# Patient Record
Sex: MALE | Race: WHITE | HISPANIC OR LATINO
[De-identification: names, ages, dates, MRNs, and addresses within clinical notes are randomized per-mention and may not be internally consistent; named-entity substitution may affect disease eponyms.]

---

## 2017-01-03 ENCOUNTER — APPOINTMENT (OUTPATIENT)
Dept: CT IMAGING | Facility: HOSPITAL | Age: 67
End: 2017-01-03

## 2017-01-08 ENCOUNTER — FORM ENCOUNTER (OUTPATIENT)
Age: 67
End: 2017-01-08

## 2017-01-09 ENCOUNTER — APPOINTMENT (OUTPATIENT)
Dept: CT IMAGING | Facility: HOSPITAL | Age: 67
End: 2017-01-09

## 2017-01-09 ENCOUNTER — APPOINTMENT (OUTPATIENT)
Dept: HEMATOLOGY ONCOLOGY | Facility: CLINIC | Age: 67
End: 2017-01-09

## 2017-01-09 ENCOUNTER — OUTPATIENT (OUTPATIENT)
Dept: OUTPATIENT SERVICES | Facility: HOSPITAL | Age: 67
LOS: 1 days | End: 2017-01-09
Payer: COMMERCIAL

## 2017-01-09 PROCEDURE — 71260 CT THORAX DX C+: CPT

## 2017-01-09 PROCEDURE — 74177 CT ABD & PELVIS W/CONTRAST: CPT

## 2017-01-23 ENCOUNTER — APPOINTMENT (OUTPATIENT)
Dept: HEMATOLOGY ONCOLOGY | Facility: CLINIC | Age: 67
End: 2017-01-23

## 2017-01-23 VITALS
SYSTOLIC BLOOD PRESSURE: 132 MMHG | DIASTOLIC BLOOD PRESSURE: 80 MMHG | HEART RATE: 76 BPM | TEMPERATURE: 97.9 F | WEIGHT: 161 LBS | BODY MASS INDEX: 27.64 KG/M2

## 2017-01-23 DIAGNOSIS — N40.0 BENIGN PROSTATIC HYPERPLASIA WITHOUT LOWER URINARY TRACT SYMPMS: ICD-10-CM

## 2017-05-08 ENCOUNTER — APPOINTMENT (OUTPATIENT)
Dept: HEMATOLOGY ONCOLOGY | Facility: CLINIC | Age: 67
End: 2017-05-08

## 2017-05-08 VITALS
WEIGHT: 163 LBS | SYSTOLIC BLOOD PRESSURE: 122 MMHG | BODY MASS INDEX: 27.98 KG/M2 | HEART RATE: 68 BPM | DIASTOLIC BLOOD PRESSURE: 60 MMHG | TEMPERATURE: 98.7 F

## 2018-03-06 ENCOUNTER — APPOINTMENT (OUTPATIENT)
Dept: HEMATOLOGY ONCOLOGY | Facility: CLINIC | Age: 68
End: 2018-03-06
Payer: COMMERCIAL

## 2018-03-06 VITALS
SYSTOLIC BLOOD PRESSURE: 120 MMHG | HEIGHT: 64 IN | BODY MASS INDEX: 26.98 KG/M2 | HEART RATE: 76 BPM | RESPIRATION RATE: 14 BRPM | WEIGHT: 158 LBS | DIASTOLIC BLOOD PRESSURE: 60 MMHG | TEMPERATURE: 98.7 F

## 2018-03-06 PROCEDURE — 99214 OFFICE O/P EST MOD 30 MIN: CPT

## 2018-07-09 ENCOUNTER — APPOINTMENT (OUTPATIENT)
Dept: HEMATOLOGY ONCOLOGY | Facility: CLINIC | Age: 68
End: 2018-07-09

## 2018-08-06 ENCOUNTER — APPOINTMENT (OUTPATIENT)
Dept: HEMATOLOGY ONCOLOGY | Facility: CLINIC | Age: 68
End: 2018-08-06
Payer: COMMERCIAL

## 2018-08-06 VITALS
HEART RATE: 72 BPM | TEMPERATURE: 98.5 F | DIASTOLIC BLOOD PRESSURE: 60 MMHG | WEIGHT: 160 LBS | BODY MASS INDEX: 27.46 KG/M2 | SYSTOLIC BLOOD PRESSURE: 128 MMHG

## 2018-08-06 PROCEDURE — 99213 OFFICE O/P EST LOW 20 MIN: CPT

## 2018-08-10 ENCOUNTER — LABORATORY RESULT (OUTPATIENT)
Age: 68
End: 2018-08-10

## 2018-08-10 LAB
BASOPHILS # BLD AUTO: 0.02 K/UL
BASOPHILS NFR BLD AUTO: 0.2 %
EOSINOPHIL # BLD AUTO: 0.14 K/UL
EOSINOPHIL NFR BLD AUTO: 1.3 %
HCT VFR BLD CALC: 43.4 %
HGB BLD-MCNC: 14.6 G/DL
IMM GRANULOCYTES NFR BLD AUTO: 0.2 %
LYMPHOCYTES # BLD AUTO: 7.44 K/UL
LYMPHOCYTES NFR BLD AUTO: 67.8 %
MAN DIFF?: NORMAL
MCHC RBC-ENTMCNC: 29.5 PG
MCHC RBC-ENTMCNC: 33.6 GM/DL
MCV RBC AUTO: 87.7 FL
MONOCYTES # BLD AUTO: 0.38 K/UL
MONOCYTES NFR BLD AUTO: 3.5 %
NEUTROPHILS # BLD AUTO: 2.97 K/UL
NEUTROPHILS NFR BLD AUTO: 27 %
PLATELET # BLD AUTO: 153 K/UL
RBC # BLD: 4.95 M/UL
RBC # FLD: 13.8 %
WBC # FLD AUTO: 10.97 K/UL

## 2018-08-11 LAB — LDH SERPL-CCNC: 170 U/L

## 2018-11-19 ENCOUNTER — APPOINTMENT (OUTPATIENT)
Dept: HEMATOLOGY ONCOLOGY | Facility: CLINIC | Age: 68
End: 2018-11-19
Payer: COMMERCIAL

## 2018-11-19 VITALS
DIASTOLIC BLOOD PRESSURE: 70 MMHG | WEIGHT: 158 LBS | BODY MASS INDEX: 27.12 KG/M2 | SYSTOLIC BLOOD PRESSURE: 126 MMHG | HEART RATE: 72 BPM

## 2018-11-19 DIAGNOSIS — C91.90 LYMPHOID LEUKEMIA, UNSPECIFIED NOT HAVING ACHIEVED REMISSION: ICD-10-CM

## 2018-11-19 PROCEDURE — 99214 OFFICE O/P EST MOD 30 MIN: CPT

## 2018-11-19 NOTE — ASSESSMENT
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 In light of our office relocation, patient stated he does not plan to continue followup in our office. He stated he will followup with his primary care physician, Dr. Palma and discuss the situation with her. He stated he would obtain a referral for a new hematologist/oncologist from Dr. Palma.\par Patient's questions have been answered to his satisfaction at this time. He was appreciative of care received.

## 2018-11-19 NOTE — CONSULT LETTER
[Dear  ___] : Dear  [unfilled], [Courtesy Letter:] : I had the pleasure of seeing your patient, [unfilled], in my office today. [Please see my note below.] : Please see my note below. [Consult Closing:] : Thank you very much for allowing me to participate in the care of this patient.  If you have any questions, please do not hesitate to contact me. [Sincerely,] : Sincerely, [FreeTextEntry3] : Beronica Perez M.D.

## 2018-11-19 NOTE — HISTORY OF PRESENT ILLNESS
[de-identified] : 11/2016-Patient presented with leukocytosis (increased lymphocytes). Peripheral blood flow cytometry showed a CD5 positive B-cell monotypic lymphocytosis (also positive with CD23 and FMC-7, negative CD 38) with absolute monotypic B cells approximately 5000/uL (the cutoff distinguishing CLL and monotypic B-cell lymphocytosis of undetermined significance). CT chest/abdomen/pelvis showed no lymphadenopathy or hepatosplenomegaly.\par 8/2018-Peripheral blood FISH analysis was positive in the CLL panel for hemizygous deletion of [id number] in 35% of cells. [de-identified] : Feeling well. No fevers, recent infections, lymph node complaints.\par Princess MENDOZA) present for discussion.\par

## 2020-04-06 ENCOUNTER — INPATIENT (INPATIENT)
Facility: HOSPITAL | Age: 70
LOS: 12 days | DRG: 208 | End: 2020-04-19
Attending: INTERNAL MEDICINE | Admitting: INTERNAL MEDICINE
Payer: COMMERCIAL

## 2020-04-06 VITALS
DIASTOLIC BLOOD PRESSURE: 80 MMHG | RESPIRATION RATE: 26 BRPM | OXYGEN SATURATION: 83 % | SYSTOLIC BLOOD PRESSURE: 152 MMHG | HEART RATE: 118 BPM | TEMPERATURE: 99 F | HEIGHT: 66 IN | WEIGHT: 160.06 LBS

## 2020-04-06 DIAGNOSIS — J96.01 ACUTE RESPIRATORY FAILURE WITH HYPOXIA: ICD-10-CM

## 2020-04-06 LAB
ALBUMIN SERPL ELPH-MCNC: 3.1 G/DL — LOW (ref 3.3–5)
ALP SERPL-CCNC: 76 U/L — SIGNIFICANT CHANGE UP (ref 40–120)
ALT FLD-CCNC: 44 U/L — SIGNIFICANT CHANGE UP (ref 10–45)
ANION GAP SERPL CALC-SCNC: 14 MMOL/L — SIGNIFICANT CHANGE UP (ref 5–17)
AST SERPL-CCNC: 65 U/L — HIGH (ref 10–40)
BASOPHILS # BLD AUTO: 0.04 K/UL — SIGNIFICANT CHANGE UP (ref 0–0.2)
BASOPHILS NFR BLD AUTO: 0.3 % — SIGNIFICANT CHANGE UP (ref 0–2)
BILIRUB SERPL-MCNC: 0.6 MG/DL — SIGNIFICANT CHANGE UP (ref 0.2–1.2)
BUN SERPL-MCNC: 14 MG/DL — SIGNIFICANT CHANGE UP (ref 7–23)
CALCIUM SERPL-MCNC: 8.7 MG/DL — SIGNIFICANT CHANGE UP (ref 8.4–10.5)
CHLORIDE SERPL-SCNC: 97 MMOL/L — SIGNIFICANT CHANGE UP (ref 96–108)
CO2 SERPL-SCNC: 23 MMOL/L — SIGNIFICANT CHANGE UP (ref 22–31)
CREAT SERPL-MCNC: 0.71 MG/DL — SIGNIFICANT CHANGE UP (ref 0.5–1.3)
CRP SERPL-MCNC: 19.86 MG/DL — HIGH (ref 0–0.4)
D DIMER BLD IA.RAPID-MCNC: 349 NG/ML DDU — HIGH
EOSINOPHIL # BLD AUTO: 0 K/UL — SIGNIFICANT CHANGE UP (ref 0–0.5)
EOSINOPHIL NFR BLD AUTO: 0 % — SIGNIFICANT CHANGE UP (ref 0–6)
FERRITIN SERPL-MCNC: 962 NG/ML — HIGH (ref 30–400)
GLUCOSE BLDC GLUCOMTR-MCNC: 203 MG/DL — HIGH (ref 70–99)
GLUCOSE BLDC GLUCOMTR-MCNC: 214 MG/DL — HIGH (ref 70–99)
GLUCOSE BLDC GLUCOMTR-MCNC: 249 MG/DL — HIGH (ref 70–99)
GLUCOSE BLDC GLUCOMTR-MCNC: 305 MG/DL — HIGH (ref 70–99)
GLUCOSE SERPL-MCNC: 228 MG/DL — HIGH (ref 70–99)
HCT VFR BLD CALC: 42.6 % — SIGNIFICANT CHANGE UP (ref 39–50)
HGB BLD-MCNC: 14.1 G/DL — SIGNIFICANT CHANGE UP (ref 13–17)
IMM GRANULOCYTES NFR BLD AUTO: 0.8 % — SIGNIFICANT CHANGE UP (ref 0–1.5)
LACTATE SERPL-SCNC: 1.8 MMOL/L — SIGNIFICANT CHANGE UP (ref 0.7–2)
LDH SERPL L TO P-CCNC: 671 U/L — HIGH (ref 50–242)
LYMPHOCYTES # BLD AUTO: 52.9 % — HIGH (ref 13–44)
LYMPHOCYTES # BLD AUTO: 7.98 K/UL — HIGH (ref 1–3.3)
MCHC RBC-ENTMCNC: 29.3 PG — SIGNIFICANT CHANGE UP (ref 27–34)
MCHC RBC-ENTMCNC: 33.1 GM/DL — SIGNIFICANT CHANGE UP (ref 32–36)
MCV RBC AUTO: 88.4 FL — SIGNIFICANT CHANGE UP (ref 80–100)
MONOCYTES # BLD AUTO: 0.38 K/UL — SIGNIFICANT CHANGE UP (ref 0–0.9)
MONOCYTES NFR BLD AUTO: 2.5 % — SIGNIFICANT CHANGE UP (ref 2–14)
NEUTROPHILS # BLD AUTO: 6.56 K/UL — SIGNIFICANT CHANGE UP (ref 1.8–7.4)
NEUTROPHILS NFR BLD AUTO: 43.5 % — SIGNIFICANT CHANGE UP (ref 43–77)
NRBC # BLD: 0 /100 WBCS — SIGNIFICANT CHANGE UP (ref 0–0)
PLATELET # BLD AUTO: 190 K/UL — SIGNIFICANT CHANGE UP (ref 150–400)
POTASSIUM SERPL-MCNC: 4.7 MMOL/L — SIGNIFICANT CHANGE UP (ref 3.5–5.3)
POTASSIUM SERPL-SCNC: 4.7 MMOL/L — SIGNIFICANT CHANGE UP (ref 3.5–5.3)
PROCALCITONIN SERPL-MCNC: 0.25 NG/ML — HIGH
PROT SERPL-MCNC: 7.5 G/DL — SIGNIFICANT CHANGE UP (ref 6–8.3)
RBC # BLD: 4.82 M/UL — SIGNIFICANT CHANGE UP (ref 4.2–5.8)
RBC # FLD: 13.5 % — SIGNIFICANT CHANGE UP (ref 10.3–14.5)
SODIUM SERPL-SCNC: 134 MMOL/L — LOW (ref 135–145)
WBC # BLD: 15.08 K/UL — HIGH (ref 3.8–10.5)
WBC # FLD AUTO: 15.08 K/UL — HIGH (ref 3.8–10.5)

## 2020-04-06 PROCEDURE — 99291 CRITICAL CARE FIRST HOUR: CPT

## 2020-04-06 PROCEDURE — 71045 X-RAY EXAM CHEST 1 VIEW: CPT | Mod: 26

## 2020-04-06 PROCEDURE — 99223 1ST HOSP IP/OBS HIGH 75: CPT

## 2020-04-06 PROCEDURE — 93010 ELECTROCARDIOGRAM REPORT: CPT

## 2020-04-06 RX ORDER — SITAGLIPTIN 50 MG/1
1 TABLET, FILM COATED ORAL
Qty: 0 | Refills: 0 | DISCHARGE

## 2020-04-06 RX ORDER — LISINOPRIL 2.5 MG/1
1 TABLET ORAL
Qty: 0 | Refills: 0 | DISCHARGE

## 2020-04-06 RX ORDER — ALBUTEROL 90 UG/1
2 AEROSOL, METERED ORAL EVERY 4 HOURS
Refills: 0 | Status: DISCONTINUED | OUTPATIENT
Start: 2020-04-06 | End: 2020-04-19

## 2020-04-06 RX ORDER — DEXTROSE 50 % IN WATER 50 %
25 SYRINGE (ML) INTRAVENOUS ONCE
Refills: 0 | Status: DISCONTINUED | OUTPATIENT
Start: 2020-04-06 | End: 2020-04-19

## 2020-04-06 RX ORDER — HYDROXYCHLOROQUINE SULFATE 200 MG
200 TABLET ORAL EVERY 12 HOURS
Refills: 0 | Status: COMPLETED | OUTPATIENT
Start: 2020-04-07 | End: 2020-04-10

## 2020-04-06 RX ORDER — HYDROXYCHLOROQUINE SULFATE 200 MG
400 TABLET ORAL EVERY 12 HOURS
Refills: 0 | Status: COMPLETED | OUTPATIENT
Start: 2020-04-06 | End: 2020-04-06

## 2020-04-06 RX ORDER — LOVASTATIN 20 MG
1 TABLET ORAL
Qty: 0 | Refills: 0 | DISCHARGE

## 2020-04-06 RX ORDER — GUAIFENESIN/DEXTROMETHORPHAN 600MG-30MG
10 TABLET, EXTENDED RELEASE 12 HR ORAL EVERY 4 HOURS
Refills: 0 | Status: DISCONTINUED | OUTPATIENT
Start: 2020-04-06 | End: 2020-04-13

## 2020-04-06 RX ORDER — SODIUM CHLORIDE 9 MG/ML
1000 INJECTION INTRAMUSCULAR; INTRAVENOUS; SUBCUTANEOUS ONCE
Refills: 0 | Status: COMPLETED | OUTPATIENT
Start: 2020-04-06 | End: 2020-04-06

## 2020-04-06 RX ORDER — THIAMINE MONONITRATE (VIT B1) 100 MG
200 TABLET ORAL DAILY
Refills: 0 | Status: DISCONTINUED | OUTPATIENT
Start: 2020-04-06 | End: 2020-04-13

## 2020-04-06 RX ORDER — ACETAMINOPHEN 500 MG
650 TABLET ORAL EVERY 4 HOURS
Refills: 0 | Status: DISCONTINUED | OUTPATIENT
Start: 2020-04-06 | End: 2020-04-17

## 2020-04-06 RX ORDER — INSULIN LISPRO 100/ML
VIAL (ML) SUBCUTANEOUS AT BEDTIME
Refills: 0 | Status: DISCONTINUED | OUTPATIENT
Start: 2020-04-06 | End: 2020-04-08

## 2020-04-06 RX ORDER — DEXTROSE 50 % IN WATER 50 %
12.5 SYRINGE (ML) INTRAVENOUS ONCE
Refills: 0 | Status: DISCONTINUED | OUTPATIENT
Start: 2020-04-06 | End: 2020-04-19

## 2020-04-06 RX ORDER — ANAKINRA 100MG/0.67
100 SYRINGE (ML) SUBCUTANEOUS EVERY 6 HOURS
Refills: 0 | Status: DISCONTINUED | OUTPATIENT
Start: 2020-04-06 | End: 2020-04-06

## 2020-04-06 RX ORDER — ENOXAPARIN SODIUM 100 MG/ML
40 INJECTION SUBCUTANEOUS DAILY
Refills: 0 | Status: DISCONTINUED | OUTPATIENT
Start: 2020-04-06 | End: 2020-04-06

## 2020-04-06 RX ORDER — HYDROXYCHLOROQUINE SULFATE 200 MG
TABLET ORAL
Refills: 0 | Status: COMPLETED | OUTPATIENT
Start: 2020-04-06 | End: 2020-04-10

## 2020-04-06 RX ORDER — TUBERCULIN PURIFIED PROTEIN DERIVATIVE 5 [IU]/.1ML
5 INJECTION, SOLUTION INTRADERMAL ONCE
Refills: 0 | Status: COMPLETED | OUTPATIENT
Start: 2020-04-06 | End: 2020-04-06

## 2020-04-06 RX ORDER — DEXTROSE 50 % IN WATER 50 %
15 SYRINGE (ML) INTRAVENOUS ONCE
Refills: 0 | Status: DISCONTINUED | OUTPATIENT
Start: 2020-04-06 | End: 2020-04-12

## 2020-04-06 RX ORDER — SODIUM CHLORIDE 9 MG/ML
1000 INJECTION, SOLUTION INTRAVENOUS
Refills: 0 | Status: DISCONTINUED | OUTPATIENT
Start: 2020-04-06 | End: 2020-04-12

## 2020-04-06 RX ORDER — METFORMIN HYDROCHLORIDE 850 MG/1
1 TABLET ORAL
Qty: 0 | Refills: 0 | DISCHARGE

## 2020-04-06 RX ORDER — ANAKINRA 100MG/0.67
100 SYRINGE (ML) SUBCUTANEOUS
Refills: 0 | Status: DISCONTINUED | OUTPATIENT
Start: 2020-04-06 | End: 2020-04-06

## 2020-04-06 RX ORDER — ACETAMINOPHEN 500 MG
650 TABLET ORAL ONCE
Refills: 0 | Status: COMPLETED | OUTPATIENT
Start: 2020-04-06 | End: 2020-04-06

## 2020-04-06 RX ORDER — INSULIN LISPRO 100/ML
VIAL (ML) SUBCUTANEOUS
Refills: 0 | Status: DISCONTINUED | OUTPATIENT
Start: 2020-04-06 | End: 2020-04-08

## 2020-04-06 RX ORDER — ENOXAPARIN SODIUM 100 MG/ML
40 INJECTION SUBCUTANEOUS EVERY 12 HOURS
Refills: 0 | Status: DISCONTINUED | OUTPATIENT
Start: 2020-04-06 | End: 2020-04-18

## 2020-04-06 RX ORDER — GLUCAGON INJECTION, SOLUTION 0.5 MG/.1ML
1 INJECTION, SOLUTION SUBCUTANEOUS ONCE
Refills: 0 | Status: DISCONTINUED | OUTPATIENT
Start: 2020-04-06 | End: 2020-04-12

## 2020-04-06 RX ORDER — LANOLIN ALCOHOL/MO/W.PET/CERES
3 CREAM (GRAM) TOPICAL AT BEDTIME
Refills: 0 | Status: DISCONTINUED | OUTPATIENT
Start: 2020-04-06 | End: 2020-04-13

## 2020-04-06 RX ORDER — PANTOPRAZOLE SODIUM 20 MG/1
40 TABLET, DELAYED RELEASE ORAL
Refills: 0 | Status: DISCONTINUED | OUTPATIENT
Start: 2020-04-06 | End: 2020-04-12

## 2020-04-06 RX ORDER — ACETAMINOPHEN 500 MG
650 TABLET ORAL EVERY 4 HOURS
Refills: 0 | Status: DISCONTINUED | OUTPATIENT
Start: 2020-04-06 | End: 2020-04-12

## 2020-04-06 RX ORDER — DAPAGLIFLOZIN 10 MG/1
1 TABLET, FILM COATED ORAL
Qty: 0 | Refills: 0 | DISCHARGE

## 2020-04-06 RX ADMIN — Medication 4: at 11:49

## 2020-04-06 RX ADMIN — Medication 400 MILLIGRAM(S): at 17:45

## 2020-04-06 RX ADMIN — ENOXAPARIN SODIUM 40 MILLIGRAM(S): 100 INJECTION SUBCUTANEOUS at 11:51

## 2020-04-06 RX ADMIN — TUBERCULIN PURIFIED PROTEIN DERIVATIVE 5 UNIT(S): 5 INJECTION, SOLUTION INTRADERMAL at 18:48

## 2020-04-06 RX ADMIN — SODIUM CHLORIDE 1000 MILLILITER(S): 9 INJECTION INTRAMUSCULAR; INTRAVENOUS; SUBCUTANEOUS at 04:30

## 2020-04-06 RX ADMIN — SODIUM CHLORIDE 1000 MILLILITER(S): 9 INJECTION INTRAMUSCULAR; INTRAVENOUS; SUBCUTANEOUS at 03:30

## 2020-04-06 RX ADMIN — Medication 400 MILLIGRAM(S): at 06:16

## 2020-04-06 RX ADMIN — Medication 35 MILLIGRAM(S): at 17:49

## 2020-04-06 RX ADMIN — Medication 650 MILLIGRAM(S): at 03:17

## 2020-04-06 RX ADMIN — PANTOPRAZOLE SODIUM 40 MILLIGRAM(S): 20 TABLET, DELAYED RELEASE ORAL at 06:17

## 2020-04-06 RX ADMIN — Medication 200 MILLIGRAM(S): at 17:44

## 2020-04-06 RX ADMIN — Medication 2: at 17:41

## 2020-04-06 RX ADMIN — Medication 2: at 06:19

## 2020-04-06 RX ADMIN — Medication 35 MILLIGRAM(S): at 06:17

## 2020-04-06 RX ADMIN — ENOXAPARIN SODIUM 40 MILLIGRAM(S): 100 INJECTION SUBCUTANEOUS at 17:42

## 2020-04-06 NOTE — H&P ADULT - NSHPREVIEWOFSYSTEMS_GEN_ALL_CORE
CONSTITUTIONAL: + fever, weight loss, +fatigue  EYES: No eye pain, visual disturbances, or discharge  ENMT:  No difficulty hearing, tinnitus, vertigo; No sinus or throat pain  NECK: No pain or stiffness  RESPIRATORY: + cough, no wheezing, chills or hemoptysis; ++ shortness of breath  CARDIOVASCULAR: No chest pain, palpitations, dizziness, or leg swelling  GASTROINTESTINAL: No abdominal or epigastric pain. No nausea, vomiting, or hematemesis; No diarrhea or constipation. No melena or hematochezia.  GENITOURINARY: No dysuria, frequency, hematuria, or incontinence  NEUROLOGICAL: No headaches, memory loss, loss of strength, numbness, or tremors  SKIN: No itching, burning, rashes, or lesions   LYMPH NODES: No enlarged glands  ENDOCRINE: No heat or cold intolerance; No hair loss  MUSCULOSKELETAL: No joint pain or swelling; No muscle, back, or extremity pain  PSYCHIATRIC: No depression, anxiety, mood swings, or difficulty sleeping  HEME/LYMPH: No easy bruising, or bleeding gums  ALLERY AND IMMUNOLOGIC: No hives or eczema    IMPROVE VTE Individual Risk Assessment          RISK                                                          Points  [  ] Previous VTE                                                3  [  ] Thrombophilia                                             2  [  ] Lower limb paralysis                                   2        (unable to hold up >15 seconds)    [  ] Current Cancer                                             2         (within 6 months)  [  ] Immobilization > 24 hrs                              1  [  ] ICU/CCU stay > 24 hours                             1  [  1] Age > 60                                                         1    IMPROVE VTE Score:         [   1      ]    Total Risk Factor Score:    0 - 1:   Consider IPC  >2 - 3:  Thromboprophylaxis required (enoxaparin or SQ heparin)        >4:   High Risk: Thromboprophylaxis required (enoxaparin or SQ heparin), optional add IPC  **If CONTRAINDICATION to enoxaparin or SQ heparin, USE IPCs**

## 2020-04-06 NOTE — ED ADULT NURSE REASSESSMENT NOTE - NS ED NURSE REASSESS COMMENT FT1
MD islas at bedside for admission to telemetry at this time. pt remains on nonrebreather as per MD orders, will continue to monitor closely.

## 2020-04-06 NOTE — ED ADULT TRIAGE NOTE - CHIEF COMPLAINT QUOTE
Pt c/o cough and fever for the past days with worsening shortness of breath tonight. Pt took tyenol last night approx 11pm. Pt presents tachypneic in triage, O2 sat 83% on room air.

## 2020-04-06 NOTE — PROGRESS NOTE ADULT - SUBJECTIVE AND OBJECTIVE BOX
Patient is a 69y old  Male who presents with a chief complaint of fever, cough, hypoxia (06 Apr 2020 05:01)      Patient seen and examined at bedside.    ALLERGIES:  No Known Allergies    MEDICATIONS  (STANDING):  dextrose 5%. 1000 milliLiter(s) (50 mL/Hr) IV Continuous <Continuous>  dextrose 50% Injectable 12.5 Gram(s) IV Push once  dextrose 50% Injectable 25 Gram(s) IV Push once  dextrose 50% Injectable 25 Gram(s) IV Push once  enoxaparin Injectable 40 milliGRAM(s) SubCutaneous daily  hydroxychloroquine   Oral   hydroxychloroquine 400 milliGRAM(s) Oral every 12 hours  insulin lispro (HumaLOG) corrective regimen sliding scale   SubCutaneous three times a day before meals  insulin lispro (HumaLOG) corrective regimen sliding scale   SubCutaneous at bedtime  methylPREDNISolone sodium succinate Injectable 35 milliGRAM(s) IV Push every 12 hours  pantoprazole    Tablet 40 milliGRAM(s) Oral before breakfast  thiamine 200 milliGRAM(s) Oral daily    MEDICATIONS  (PRN):  acetaminophen   Tablet .. 650 milliGRAM(s) Oral every 4 hours PRN Temp greater or equal to 38.5C (101.3F)  acetaminophen  Suppository .. 650 milliGRAM(s) Rectal every 4 hours PRN Temp greater or equal to 38.5C (101.3F)  ALBUTerol    90 MICROgram(s) HFA Inhaler 2 Puff(s) Inhalation every 4 hours PRN Shortness of Breath and/or Wheezing  dextrose 40% Gel 15 Gram(s) Oral once PRN Blood Glucose LESS THAN 70 milliGRAM(s)/deciliter  glucagon  Injectable 1 milliGRAM(s) IntraMuscular once PRN Glucose LESS THAN 70 milligrams/deciliter  guaifenesin/dextromethorphan  Syrup 10 milliLiter(s) Oral every 4 hours PRN Cough  melatonin 3 milliGRAM(s) Oral at bedtime PRN Insomnia    Vital Signs Last 24 Hrs  T(F): 98.1 (06 Apr 2020 06:33), Max: 99 (06 Apr 2020 03:08)  HR: 83 (06 Apr 2020 07:13) (83 - 118)  BP: 126/71 (06 Apr 2020 06:33) (105/69 - 152/80)  RR: 23 (06 Apr 2020 07:13) (20 - 26)  SpO2: 100% (06 Apr 2020 07:13) (83% - 100%)  I&O's Summary    BMI (kg/m2): 25.8 (04-06-20 @ 03:08)  PHYSICAL EXAM:  General: NAD, A/O x 3  ENT: MMM  Neck: Supple, No JVD  Lungs: Clear to auscultation bilaterally  Cardio: RRR, S1/S2, No murmurs  Abdomen: Soft, Nontender, Nondistended; Bowel sounds present  Extremities: No calf tenderness, No pitting edema    LABS:                        14.1   15.08 )-----------( 190      ( 06 Apr 2020 03:18 )             42.6       04-06    134  |  97  |  14  ----------------------------<  228  4.7   |  23  |  0.71    Ca    8.7      06 Apr 2020 03:18    TPro  7.5  /  Alb  3.1  /  TBili  0.6  /  DBili  x   /  AST  65  /  ALT  44  /  AlkPhos  76  04-06     eGFR if : 112 mL/min/1.73M2 (04-06-20 @ 03:18)  eGFR if Non African American: 96 mL/min/1.73M2 (04-06-20 @ 03:18)     Lactate, Blood: 1.8 mmol/L (04-06 @ 03:18)    POCT Blood Glucose.: 203 mg/dL (06 Apr 2020 06:11)    RADIOLOGY & ADDITIONAL TESTS:    Care Discussed with Consultants/Other Providers:

## 2020-04-06 NOTE — H&P ADULT - NSHPPHYSICALEXAM_GEN_ALL_CORE
Vital Signs Last 24 Hrs  T(C): 37.2 (06 Apr 2020 03:08), Max: 37.2 (06 Apr 2020 03:08)  T(F): 99 (06 Apr 2020 03:08), Max: 99 (06 Apr 2020 03:08)  HR: 107 (06 Apr 2020 03:20) (107 - 118)  BP: 131/75 (06 Apr 2020 03:20) (131/75 - 152/80)  BP(mean): --  RR: 24 (06 Apr 2020 03:20) (24 - 26)  SpO2: 99% (06 Apr 2020 03:20) (83% - 99%)  Daily Height in cm: 167.64 (06 Apr 2020 03:08)    Daily   CAPILLARY BLOOD GLUCOSE        I&O's Summary      GENERAL: NAD  HEAD:  Normocephalic  EYES: EOMI, PERRLA, conjunctiva and sclera clear  ENMT: No tonsillar erythema, exudates, or enlargement; Moist mucous membranes, No lesions  NECK: Supple, No JVD, no bruit, normal thyroid  NERVOUS SYSTEM:  Alert & Oriented X3, Good concentration; grossly  Motor Strength 5/5 B/L upper and lower extremities; DTRs 2+ intact and symmetric  CHEST/LUNG: bilateral crackles to mid lungs.  No rhonchi, wheezing, or rubs  HEART: Regular rate and rhythm; No murmurs, rubs, or gallops  ABDOMEN: Soft, Nontender, Nondistended; Bowel sounds present  EXTREMITIES:  2+ Peripheral Pulses, No clubbing, cyanosis, or edema  LYMPH: No lymphadenopathy noted  SKIN: No rashes or lesions

## 2020-04-06 NOTE — ED ADULT NURSE REASSESSMENT NOTE - NS ED NURSE REASSESS COMMENT FT1
Increased shortness of breath and tachypnea, contacted Dr. Slaughter, seen and evaluated, remain on nonrebreather, sat 96.

## 2020-04-06 NOTE — H&P ADULT - HISTORY OF PRESENT ILLNESS
69 Turkmen speaking male with hx of T2DM not on insulin, CLL pw fevers, nonproductive cough and SOB x 3 days. SOB much worse and brought in by son. Denied CP, NVD, myalgias. Pt states he takes four meds but can only remember Januvia. [facility]. No COVID contacts.   In ED, afebrile, tachy 118 BP normotensive to hypertensive sat 83% on RA, NC in high 80s and 99% on 100%NRB.   WBC: 15.1 predominantly lymphocytes, AST:65.

## 2020-04-06 NOTE — ED ADULT TRIAGE NOTE - NS ED TRIAGE AVPU SCALE
Notify patient that  I have placed orders for Dr. Angelita galvez at [facility] to see if we can get an earlier appointment.   Alert-The patient is alert, awake and responds to voice. The patient is oriented to time, place, and person. The triage nurse is able to obtain subjective information.

## 2020-04-06 NOTE — PROGRESS NOTE ADULT - ASSESSMENT
69M hx of CLL, T2DM pw fever, cough, hypoxia with bl infiltrates on CXR    Suspected COVID19   Acute hypoxic respiratory failure  - hypoxia currently on NRB saturating 96% - titrate as tolerated  - Leukocytosis noted  - monitor CBC with Diff, BMP, d-dimer, ferritin, LDH, CRP, ESR  - day 1/5 plaquenil  - mucinex/melatonin    T2DM  correctional insulin    DVT/GI proph

## 2020-04-06 NOTE — ED PROVIDER NOTE - CARE PLAN
Principal Discharge DX:	Acute respiratory failure with hypoxia Principal Discharge DX:	Acute respiratory failure with hypoxia  Secondary Diagnosis:	Viral pneumonia

## 2020-04-06 NOTE — ED ADULT NURSE REASSESSMENT NOTE - NS ED NURSE REASSESS COMMENT FT1
pt with , no bedtime coverage needed at this time. pt sleeping comfortably in stretcher at this time. pt remains on cardiac monitor and nonrebreather on 15L, will continue to monitor closely.

## 2020-04-06 NOTE — ED ADULT NURSE NOTE - OBJECTIVE STATEMENT
pt came in from home with difficulty breathing, SOB. Pt son reports that pt has been having fever and cough for last 2-3 days. Pt reports hes has fever for the past days with worsening shortness of breath tonight. Pt took tyenol last night approx 11pm. Pt presents tachypneic in triage, O2 sat 83% on room air. pt placed on nonrebreather immediately as per MD orders. pt denies any pain at this time. Pt denies any n/v/d, chest pain, SOB, blurred vision, headache, dizziness, fever, chills or any other complaint at this time.

## 2020-04-06 NOTE — ED PROVIDER NOTE - PHYSICAL EXAMINATION
Gen:  alert, awake, no acute distress  HEENT:  atraumatic head, airway clear, pupils equal and round  CV:  rrr, nl S1, S2, no m/r/g  Pulm:  lungs with diffuse coarse breath sounds  Abd: s/nt/nd, +BS  Ext:  moving all extremities  Neuro:  grossly intact, no focal deficits  Skin:  clear, dry, intact  Psych: AOx3, normal affect, no apparent risk to self or others

## 2020-04-06 NOTE — ED PROVIDER NOTE - CLINICAL SUMMARY MEDICAL DECISION MAKING FREE TEXT BOX
pt with severe SOB saturation of 75% on RA pt with severe SOB saturation of 75% on RA, sx likely 2/2 to COVID-19 related illness and viral pna, will need admission and close monitoring given that pt is requiring NRB mask to maintain good saturation.

## 2020-04-06 NOTE — ED ADULT NURSE REASSESSMENT NOTE - NS ED NURSE REASSESS COMMENT FT1
Pt care taken over from ANTONIO Sofia at 4am, pt resting comfortably in stretcher with nonrebreather in place O2 sat at 98%. pt with no complaints at thi time. will continue to monitor closely.

## 2020-04-06 NOTE — H&P ADULT - NSHPLABSRESULTS_GEN_ALL_CORE
14.1   15.08 )-----------( 190      ( 06 Apr 2020 03:18 )             42.6       04-06    134<L>  |  97  |  14  ----------------------------<  228<H>  4.7   |  23  |  0.71    Ca    8.7      06 Apr 2020 03:18    TPro  7.5  /  Alb  3.1<L>  /  TBili  0.6  /  DBili  x   /  AST  65<H>  /  ALT  44  /  AlkPhos  76  04-06    Lactate, Blood: 1.8 mmol/L (04-06 @ 03:18)       LIVER FUNCTIONS - ( 06 Apr 2020 03:18 )  Alb: 3.1 g/dL / Pro: 7.5 g/dL / ALK PHOS: 76 U/L / ALT: 44 U/L / AST: 65 U/L / GGT: x             04-06 @ 03:23  349 ng/mL DDU<H>      EKG: sinus       CXR: wet read bl diffuse GGO.

## 2020-04-06 NOTE — H&P ADULT - ASSESSMENT
69M hx of CLL, T2DM pw fever, cough, hypoxia with bl infiltrates on CXR    #Acute respiratory failure with hypoxia with bl infiltrates COVID suspect.  cont COVID iso protocol  initiate COVID med protocol with plaquenil, thiamine given high index of suspicion and sig oxygen requirements.   Add on IV steroids at 1mg/kg in divided doses.   check inflammatory markers.   cont supplemental oxygen. cont pulse ox.     # T2DM  correctional insulin    DVT/GI proph

## 2020-04-06 NOTE — ED ADULT NURSE REASSESSMENT NOTE - NS ED NURSE REASSESS COMMENT FT1
pt care taken over from ANTONIO andrade at 7p. pt resting comfortably in stretcher with no complaints at his time. pt remains cardiac monitor and nonrebreather with an 02 sat of 94% on 15L. will continue to monitor closely.

## 2020-04-07 LAB
ALBUMIN SERPL ELPH-MCNC: 2.9 G/DL — LOW (ref 3.3–5)
ALP SERPL-CCNC: 71 U/L — SIGNIFICANT CHANGE UP (ref 40–120)
ALT FLD-CCNC: 50 U/L — HIGH (ref 10–45)
ANION GAP SERPL CALC-SCNC: 12 MMOL/L — SIGNIFICANT CHANGE UP (ref 5–17)
AST SERPL-CCNC: 55 U/L — HIGH (ref 10–40)
BASOPHILS # BLD AUTO: 0.04 K/UL — SIGNIFICANT CHANGE UP (ref 0–0.2)
BASOPHILS NFR BLD AUTO: 0.2 % — SIGNIFICANT CHANGE UP (ref 0–2)
BILIRUB SERPL-MCNC: 0.4 MG/DL — SIGNIFICANT CHANGE UP (ref 0.2–1.2)
BUN SERPL-MCNC: 19 MG/DL — SIGNIFICANT CHANGE UP (ref 7–23)
CALCIUM SERPL-MCNC: 8.9 MG/DL — SIGNIFICANT CHANGE UP (ref 8.4–10.5)
CHLORIDE SERPL-SCNC: 104 MMOL/L — SIGNIFICANT CHANGE UP (ref 96–108)
CO2 SERPL-SCNC: 23 MMOL/L — SIGNIFICANT CHANGE UP (ref 22–31)
CREAT SERPL-MCNC: 0.53 MG/DL — SIGNIFICANT CHANGE UP (ref 0.5–1.3)
CRP SERPL-MCNC: 12.01 MG/DL — HIGH (ref 0–0.4)
D DIMER BLD IA.RAPID-MCNC: 422 NG/ML DDU — HIGH
EOSINOPHIL # BLD AUTO: 0 K/UL — SIGNIFICANT CHANGE UP (ref 0–0.5)
EOSINOPHIL NFR BLD AUTO: 0 % — SIGNIFICANT CHANGE UP (ref 0–6)
FERRITIN SERPL-MCNC: 991 NG/ML — HIGH (ref 30–400)
GLUCOSE BLDC GLUCOMTR-MCNC: 205 MG/DL — HIGH (ref 70–99)
GLUCOSE BLDC GLUCOMTR-MCNC: 221 MG/DL — HIGH (ref 70–99)
GLUCOSE BLDC GLUCOMTR-MCNC: 287 MG/DL — HIGH (ref 70–99)
GLUCOSE BLDC GLUCOMTR-MCNC: 304 MG/DL — HIGH (ref 70–99)
GLUCOSE SERPL-MCNC: 224 MG/DL — HIGH (ref 70–99)
HBA1C BLD-MCNC: 8.8 % — HIGH (ref 4–5.6)
HCT VFR BLD CALC: 42 % — SIGNIFICANT CHANGE UP (ref 39–50)
HCV AB S/CO SERPL IA: 0.08 S/CO — SIGNIFICANT CHANGE UP (ref 0–0.99)
HCV AB SERPL-IMP: SIGNIFICANT CHANGE UP
HGB BLD-MCNC: 13.9 G/DL — SIGNIFICANT CHANGE UP (ref 13–17)
IMM GRANULOCYTES NFR BLD AUTO: 0.7 % — SIGNIFICANT CHANGE UP (ref 0–1.5)
LDH SERPL L TO P-CCNC: 530 U/L — HIGH (ref 50–242)
LYMPHOCYTES # BLD AUTO: 48.4 % — HIGH (ref 13–44)
LYMPHOCYTES # BLD AUTO: 9.25 K/UL — HIGH (ref 1–3.3)
MCHC RBC-ENTMCNC: 29.1 PG — SIGNIFICANT CHANGE UP (ref 27–34)
MCHC RBC-ENTMCNC: 33.1 GM/DL — SIGNIFICANT CHANGE UP (ref 32–36)
MCV RBC AUTO: 87.9 FL — SIGNIFICANT CHANGE UP (ref 80–100)
MONOCYTES # BLD AUTO: 0.45 K/UL — SIGNIFICANT CHANGE UP (ref 0–0.9)
MONOCYTES NFR BLD AUTO: 2.4 % — SIGNIFICANT CHANGE UP (ref 2–14)
NEUTROPHILS # BLD AUTO: 9.24 K/UL — HIGH (ref 1.8–7.4)
NEUTROPHILS NFR BLD AUTO: 48.3 % — SIGNIFICANT CHANGE UP (ref 43–77)
NRBC # BLD: 0 /100 WBCS — SIGNIFICANT CHANGE UP (ref 0–0)
PLATELET # BLD AUTO: 203 K/UL — SIGNIFICANT CHANGE UP (ref 150–400)
POTASSIUM SERPL-MCNC: 4.3 MMOL/L — SIGNIFICANT CHANGE UP (ref 3.5–5.3)
POTASSIUM SERPL-SCNC: 4.3 MMOL/L — SIGNIFICANT CHANGE UP (ref 3.5–5.3)
PROCALCITONIN SERPL-MCNC: 0.17 NG/ML — HIGH
PROT SERPL-MCNC: 7.1 G/DL — SIGNIFICANT CHANGE UP (ref 6–8.3)
RBC # BLD: 4.78 M/UL — SIGNIFICANT CHANGE UP (ref 4.2–5.8)
RBC # FLD: 13.4 % — SIGNIFICANT CHANGE UP (ref 10.3–14.5)
SARS-COV-2 RNA SPEC QL NAA+PROBE: DETECTED
SODIUM SERPL-SCNC: 139 MMOL/L — SIGNIFICANT CHANGE UP (ref 135–145)
TROPONIN I SERPL-MCNC: <.017 NG/ML — LOW (ref 0.02–0.06)
WBC # BLD: 19.11 K/UL — HIGH (ref 3.8–10.5)
WBC # FLD AUTO: 19.11 K/UL — HIGH (ref 3.8–10.5)

## 2020-04-07 PROCEDURE — 99233 SBSQ HOSP IP/OBS HIGH 50: CPT

## 2020-04-07 RX ADMIN — ENOXAPARIN SODIUM 40 MILLIGRAM(S): 100 INJECTION SUBCUTANEOUS at 16:33

## 2020-04-07 RX ADMIN — Medication 35 MILLIGRAM(S): at 16:33

## 2020-04-07 RX ADMIN — ENOXAPARIN SODIUM 40 MILLIGRAM(S): 100 INJECTION SUBCUTANEOUS at 05:26

## 2020-04-07 RX ADMIN — Medication 200 MILLIGRAM(S): at 16:32

## 2020-04-07 RX ADMIN — Medication 3: at 16:32

## 2020-04-07 RX ADMIN — PANTOPRAZOLE SODIUM 40 MILLIGRAM(S): 20 TABLET, DELAYED RELEASE ORAL at 05:27

## 2020-04-07 RX ADMIN — Medication 35 MILLIGRAM(S): at 05:27

## 2020-04-07 RX ADMIN — Medication 200 MILLIGRAM(S): at 06:11

## 2020-04-07 RX ADMIN — Medication 200 MILLIGRAM(S): at 08:45

## 2020-04-07 RX ADMIN — Medication 2: at 08:44

## 2020-04-07 RX ADMIN — Medication 4: at 11:40

## 2020-04-07 NOTE — PROGRESS NOTE ADULT - SUBJECTIVE AND OBJECTIVE BOX
seen and examined at bedside     with nursing team updates me     is currently on NRB     no labored breathing.     no chest pain     ros all others are neg

## 2020-04-07 NOTE — ED ADULT NURSE REASSESSMENT NOTE - NS ED NURSE REASSESS COMMENT FT1
pt given all morning medications, tolerated well. pt again resting comfortably in stretcher with no complaints at this time. pt remains on cardiac monitor and nonrebreather saturating at 95% on 15L. will continue to monitor closely.

## 2020-04-07 NOTE — ED ADULT NURSE REASSESSMENT NOTE - NS ED NURSE REASSESS COMMENT FT1
pt sleeping comfortably in stretcher with no complaints at this time. Pt remains on cardiac monitor and nonrebreather at 15L with o2 sat 94%. pt sleeping comfortably in stretcher with no complaints at this time. Pt remains on cardiac monitor and nonrebreather at 15L with o2 sat 94%. will continue to monitor closely.

## 2020-04-07 NOTE — PROGRESS NOTE ADULT - ASSESSMENT
69M hx of CLL, T2DM pw fever, cough, hypoxia with bl infiltrates on CXR    frail     COVID19   Acute hypoxic respiratory failure  on NRB currently   stable   keep o2 say at 94%   COVID-19 protocol     T2DM  correctional insulin    DVT/GI proph  PT OT rehab     monitor closely

## 2020-04-08 LAB
ALBUMIN SERPL ELPH-MCNC: 2.7 G/DL — LOW (ref 3.3–5)
ALP SERPL-CCNC: 72 U/L — SIGNIFICANT CHANGE UP (ref 40–120)
ALT FLD-CCNC: 57 U/L — HIGH (ref 10–45)
ANION GAP SERPL CALC-SCNC: 13 MMOL/L — SIGNIFICANT CHANGE UP (ref 5–17)
AST SERPL-CCNC: 50 U/L — HIGH (ref 10–40)
BASOPHILS # BLD AUTO: 0.03 K/UL — SIGNIFICANT CHANGE UP (ref 0–0.2)
BASOPHILS NFR BLD AUTO: 0.2 % — SIGNIFICANT CHANGE UP (ref 0–2)
BILIRUB SERPL-MCNC: 0.5 MG/DL — SIGNIFICANT CHANGE UP (ref 0.2–1.2)
BUN SERPL-MCNC: 20 MG/DL — SIGNIFICANT CHANGE UP (ref 7–23)
CALCIUM SERPL-MCNC: 8.7 MG/DL — SIGNIFICANT CHANGE UP (ref 8.4–10.5)
CHLORIDE SERPL-SCNC: 104 MMOL/L — SIGNIFICANT CHANGE UP (ref 96–108)
CO2 SERPL-SCNC: 25 MMOL/L — SIGNIFICANT CHANGE UP (ref 22–31)
CREAT SERPL-MCNC: 0.62 MG/DL — SIGNIFICANT CHANGE UP (ref 0.5–1.3)
EOSINOPHIL # BLD AUTO: 0 K/UL — SIGNIFICANT CHANGE UP (ref 0–0.5)
EOSINOPHIL NFR BLD AUTO: 0 % — SIGNIFICANT CHANGE UP (ref 0–6)
FIBRINOGEN AG PPP IA-MCNC: 606 MG/DL — HIGH
GAMMA INTERFERON BACKGROUND BLD IA-ACNC: 0.01 IU/ML — SIGNIFICANT CHANGE UP
GLUCOSE BLDC GLUCOMTR-MCNC: 208 MG/DL — HIGH (ref 70–99)
GLUCOSE BLDC GLUCOMTR-MCNC: 298 MG/DL — HIGH (ref 70–99)
GLUCOSE BLDC GLUCOMTR-MCNC: 307 MG/DL — HIGH (ref 70–99)
GLUCOSE BLDC GLUCOMTR-MCNC: 319 MG/DL — HIGH (ref 70–99)
GLUCOSE BLDC GLUCOMTR-MCNC: 321 MG/DL — HIGH (ref 70–99)
GLUCOSE BLDC GLUCOMTR-MCNC: 357 MG/DL — HIGH (ref 70–99)
GLUCOSE SERPL-MCNC: 208 MG/DL — HIGH (ref 70–99)
HCT VFR BLD CALC: 40.3 % — SIGNIFICANT CHANGE UP (ref 39–50)
HGB BLD-MCNC: 13.1 G/DL — SIGNIFICANT CHANGE UP (ref 13–17)
IL6 FLD-MCNC: 34.2 PG/ML — HIGH (ref 0–15.5)
IMM GRANULOCYTES NFR BLD AUTO: 0.7 % — SIGNIFICANT CHANGE UP (ref 0–1.5)
LYMPHOCYTES # BLD AUTO: 43.5 % — SIGNIFICANT CHANGE UP (ref 13–44)
LYMPHOCYTES # BLD AUTO: 8 K/UL — HIGH (ref 1–3.3)
M TB IFN-G BLD-IMP: NEGATIVE — SIGNIFICANT CHANGE UP
M TB IFN-G CD4+ BCKGRND COR BLD-ACNC: 0.01 IU/ML — SIGNIFICANT CHANGE UP
M TB IFN-G CD4+CD8+ BCKGRND COR BLD-ACNC: 0.01 IU/ML — SIGNIFICANT CHANGE UP
MCHC RBC-ENTMCNC: 28.7 PG — SIGNIFICANT CHANGE UP (ref 27–34)
MCHC RBC-ENTMCNC: 32.5 GM/DL — SIGNIFICANT CHANGE UP (ref 32–36)
MCV RBC AUTO: 88.2 FL — SIGNIFICANT CHANGE UP (ref 80–100)
MONOCYTES # BLD AUTO: 0.53 K/UL — SIGNIFICANT CHANGE UP (ref 0–0.9)
MONOCYTES NFR BLD AUTO: 2.9 % — SIGNIFICANT CHANGE UP (ref 2–14)
NEUTROPHILS # BLD AUTO: 9.68 K/UL — HIGH (ref 1.8–7.4)
NEUTROPHILS NFR BLD AUTO: 52.7 % — SIGNIFICANT CHANGE UP (ref 43–77)
NRBC # BLD: 0 /100 WBCS — SIGNIFICANT CHANGE UP (ref 0–0)
PLATELET # BLD AUTO: 218 K/UL — SIGNIFICANT CHANGE UP (ref 150–400)
POTASSIUM SERPL-MCNC: 4.2 MMOL/L — SIGNIFICANT CHANGE UP (ref 3.5–5.3)
POTASSIUM SERPL-SCNC: 4.2 MMOL/L — SIGNIFICANT CHANGE UP (ref 3.5–5.3)
PROT SERPL-MCNC: 6.7 G/DL — SIGNIFICANT CHANGE UP (ref 6–8.3)
QUANT TB PLUS MITOGEN MINUS NIL: 0.53 IU/ML — SIGNIFICANT CHANGE UP
RBC # BLD: 4.57 M/UL — SIGNIFICANT CHANGE UP (ref 4.2–5.8)
RBC # FLD: 13.3 % — SIGNIFICANT CHANGE UP (ref 10.3–14.5)
SODIUM SERPL-SCNC: 142 MMOL/L — SIGNIFICANT CHANGE UP (ref 135–145)
WBC # BLD: 18.37 K/UL — HIGH (ref 3.8–10.5)
WBC # FLD AUTO: 18.37 K/UL — HIGH (ref 3.8–10.5)

## 2020-04-08 PROCEDURE — 99233 SBSQ HOSP IP/OBS HIGH 50: CPT

## 2020-04-08 RX ORDER — PIPERACILLIN AND TAZOBACTAM 4; .5 G/20ML; G/20ML
3.38 INJECTION, POWDER, LYOPHILIZED, FOR SOLUTION INTRAVENOUS ONCE
Refills: 0 | Status: COMPLETED | OUTPATIENT
Start: 2020-04-08 | End: 2020-04-08

## 2020-04-08 RX ORDER — INSULIN LISPRO 100/ML
VIAL (ML) SUBCUTANEOUS
Refills: 0 | Status: DISCONTINUED | OUTPATIENT
Start: 2020-04-08 | End: 2020-04-12

## 2020-04-08 RX ORDER — PIPERACILLIN AND TAZOBACTAM 4; .5 G/20ML; G/20ML
3.38 INJECTION, POWDER, LYOPHILIZED, FOR SOLUTION INTRAVENOUS EVERY 8 HOURS
Refills: 0 | Status: DISCONTINUED | OUTPATIENT
Start: 2020-04-08 | End: 2020-04-09

## 2020-04-08 RX ORDER — INSULIN GLARGINE 100 [IU]/ML
10 INJECTION, SOLUTION SUBCUTANEOUS ONCE
Refills: 0 | Status: COMPLETED | OUTPATIENT
Start: 2020-04-08 | End: 2020-04-08

## 2020-04-08 RX ORDER — INSULIN LISPRO 100/ML
VIAL (ML) SUBCUTANEOUS AT BEDTIME
Refills: 0 | Status: DISCONTINUED | OUTPATIENT
Start: 2020-04-08 | End: 2020-04-12

## 2020-04-08 RX ORDER — INSULIN GLARGINE 100 [IU]/ML
10 INJECTION, SOLUTION SUBCUTANEOUS AT BEDTIME
Refills: 0 | Status: DISCONTINUED | OUTPATIENT
Start: 2020-04-09 | End: 2020-04-10

## 2020-04-08 RX ADMIN — Medication 4: at 12:39

## 2020-04-08 RX ADMIN — PANTOPRAZOLE SODIUM 40 MILLIGRAM(S): 20 TABLET, DELAYED RELEASE ORAL at 06:59

## 2020-04-08 RX ADMIN — Medication 200 MILLIGRAM(S): at 17:13

## 2020-04-08 RX ADMIN — Medication 35 MILLIGRAM(S): at 17:12

## 2020-04-08 RX ADMIN — INSULIN GLARGINE 10 UNIT(S): 100 INJECTION, SOLUTION SUBCUTANEOUS at 14:49

## 2020-04-08 RX ADMIN — PIPERACILLIN AND TAZOBACTAM 200 GRAM(S): 4; .5 INJECTION, POWDER, LYOPHILIZED, FOR SOLUTION INTRAVENOUS at 23:07

## 2020-04-08 RX ADMIN — Medication 35 MILLIGRAM(S): at 06:59

## 2020-04-08 RX ADMIN — Medication 2: at 23:07

## 2020-04-08 RX ADMIN — Medication 2: at 02:27

## 2020-04-08 RX ADMIN — Medication 8: at 17:13

## 2020-04-08 RX ADMIN — Medication 200 MILLIGRAM(S): at 06:59

## 2020-04-08 RX ADMIN — ENOXAPARIN SODIUM 40 MILLIGRAM(S): 100 INJECTION SUBCUTANEOUS at 17:12

## 2020-04-08 RX ADMIN — ENOXAPARIN SODIUM 40 MILLIGRAM(S): 100 INJECTION SUBCUTANEOUS at 06:59

## 2020-04-08 RX ADMIN — Medication 200 MILLIGRAM(S): at 12:39

## 2020-04-08 RX ADMIN — Medication 2: at 08:52

## 2020-04-08 NOTE — PROGRESS NOTE ADULT - SUBJECTIVE AND OBJECTIVE BOX
seen and examined at bedside     04-08    142  |  104  |  20  ----------------------------<  208<H>  4.2   |  25  |  0.62    Ca    8.7      08 Apr 2020 07:06    TPro  6.7  /  Alb  2.7<L>  /  TBili  0.5  /  DBili  x   /  AST  50<H>  /  ALT  57<H>  /  AlkPhos  72  04-08  with nursing team updates me     is currently on NRB     no labored breathing.     no chest pain     ros all others are neg       Vital Signs Last 24 Hrs  T(C): 36.6 (08 Apr 2020 07:10), Max: 36.6 (07 Apr 2020 16:34)  T(F): 97.9 (08 Apr 2020 07:10), Max: 97.9 (08 Apr 2020 07:10)  HR: 82 (08 Apr 2020 07:10) (82 - 95)  BP: 133/- (08 Apr 2020 07:10) (125/73 - 133/-)  BP(mean): 66 (08 Apr 2020 07:10) (66 - 66)  RR: 20 (08 Apr 2020 07:10) (20 - 20)  SpO2: 96% (08 Apr 2020 07:10) (96% - 96%)      Physical exam                                     13.1   18.37 )-----------( 218      ( 08 Apr 2020 07:06 )             40.3 Hospitalist: Lauren Steward DO    CHIEF COMPLAINT: Patient is a 69y old  male who presents with a chief complaint of fever, cough, hypoxia (08 Apr 2020 10:55)    SUBJECTIVE / OVERNIGHT EVENTS: Patient seen and examined. No acute events overnight. Pain well controlled and patient without any complaints.    MEDICATIONS  (STANDING):  dextrose 5%. 1000 milliLiter(s) (50 mL/Hr) IV Continuous <Continuous>  dextrose 50% Injectable 12.5 Gram(s) IV Push once  dextrose 50% Injectable 25 Gram(s) IV Push once  dextrose 50% Injectable 25 Gram(s) IV Push once  enoxaparin Injectable 40 milliGRAM(s) SubCutaneous every 12 hours  hydroxychloroquine   Oral   hydroxychloroquine 200 milliGRAM(s) Oral every 12 hours  insulin lispro (HumaLOG) corrective regimen sliding scale   SubCutaneous three times a day before meals  insulin lispro (HumaLOG) corrective regimen sliding scale   SubCutaneous at bedtime  methylPREDNISolone sodium succinate Injectable 35 milliGRAM(s) IV Push every 12 hours  pantoprazole    Tablet 40 milliGRAM(s) Oral before breakfast  thiamine 200 milliGRAM(s) Oral daily    MEDICATIONS  (PRN):  acetaminophen   Tablet .. 650 milliGRAM(s) Oral every 4 hours PRN Temp greater or equal to 38.5C (101.3F)  acetaminophen  Suppository .. 650 milliGRAM(s) Rectal every 4 hours PRN Temp greater or equal to 38.5C (101.3F)  ALBUTerol    90 MICROgram(s) HFA Inhaler 2 Puff(s) Inhalation every 4 hours PRN Shortness of Breath and/or Wheezing  dextrose 40% Gel 15 Gram(s) Oral once PRN Blood Glucose LESS THAN 70 milliGRAM(s)/deciliter  glucagon  Injectable 1 milliGRAM(s) IntraMuscular once PRN Glucose LESS THAN 70 milligrams/deciliter  guaifenesin/dextromethorphan  Syrup 10 milliLiter(s) Oral every 4 hours PRN Cough  melatonin 3 milliGRAM(s) Oral at bedtime PRN Insomnia      VITALS:  T(F): 97.9 (04-08-20 @ 07:10), Max: 97.9 (04-08-20 @ 07:10)  HR: 82 (04-08-20 @ 07:10) (82 - 82)  BP: 133/- (04-08-20 @ 07:10) (133/- - 133/-)  RR: 20 (04-08-20 @ 07:10) (20 - 20)  SpO2: 96% (04-08-20 @ 07:10)      PHYSICAL EXAM:    GENERAL: NAD  	HEAD:  Normocephalic  	EYES: EOMI, PERRLA, conjunctiva and sclera clear  	ENMT: No tonsillar erythema, exudates, or enlargement; Moist mucous membranes, No lesions  	NECK: Supple, No JVD, no bruit, normal thyroid  	NERVOUS SYSTEM:  Alert & Oriented X3, Good concentration; grossly  Motor Strength 5/5 B/L upper and lower extremities; DTRs 2+ intact and symmetric  	CHEST/LUNG: bilateral crackles to mid lungs.  No rhonchi, wheezing, or rubs  	HEART: Regular rate and rhythm; No murmurs, rubs, or gallops  	ABDOMEN: Soft, Nontender, Nondistended; Bowel sounds present  	EXTREMITIES:  2+ Peripheral Pulses, No clubbing, cyanosis, or edema  	LYMPH: No lymphadenopathy noted    SKIN: No rashes or lesions    LABS:              13.1                 142  | 25   | 20           18.37 >-----------< 218     ------------------------< 208                   40.3                 4.2  | 104  | 0.62                                         Ca 8.7   Mg x     Ph x           TPro  6.7  /  Alb  2.7      TBili  0.5  /  DBili  x         AST  50  /  ALT  57            AlkPhos  72          CAPILLARY BLOOD GLUCOSE      POCT Blood Glucose.: 319 mg/dL (08 Apr 2020 17:08)  POCT Blood Glucose.: 357 mg/dL (08 Apr 2020 14:51)  POCT Blood Glucose.: 321 mg/dL (08 Apr 2020 12:36)  POCT Blood Glucose.: 208 mg/dL (08 Apr 2020 08:13)  POCT Blood Glucose.: 307 mg/dL (08 Apr 2020 01:19)      MICROBIOLOGY:    Culture - Blood (04.06.20 @ 03:18)    Specimen Source: .Blood Blood-Peripheral    Culture Results:   No growth to date.    Culture - Blood (04.06.20 @ 03:18)    Specimen Source: .Blood Blood-Peripheral    Culture Results:   No growth to date.          RADIOLOGY & ADDITIONAL TESTS:    Imaging Personally Reviewed:    [X ] Consultant(s) Notes Reviewed:  [X ] Care Discussed with Consultants/Other Providers:

## 2020-04-08 NOTE — PROGRESS NOTE ADULT - ASSESSMENT
69M hx of CLL, T2DM pw fever, cough, hypoxia with bl infiltrates on CXR    frail     COVID19   Acute hypoxic respiratory failure  on NRB currently   stable   keep o2 say at 94%   COVID-19 protocol     T2DM  correctional insulin    DVT/GI proph  PT OT rehab     monitor closely 69M hx of CLL, NIDDM p/w fever, cough, hypoxia with bl infiltrates on CXR; admitted for acute hypoxic respiratory distress due to COVID Penumonia    Pul: Acute hypoxic respiratory distress due to COVID Penumonia  Satting 92% on NRB  C/w COVID-19 protocol  patient appears frail    C/w Solumedrol 35 mg IVP BID | leukocytosis likely related to steroids   patient has been afebrile    T2DM  correctional insulin    DVT/GI proph  PT OT rehab

## 2020-04-09 LAB
ALBUMIN SERPL ELPH-MCNC: 3.1 G/DL — LOW (ref 3.3–5)
ALP SERPL-CCNC: 91 U/L — SIGNIFICANT CHANGE UP (ref 40–120)
ALT FLD-CCNC: 77 U/L — HIGH (ref 10–45)
ANION GAP SERPL CALC-SCNC: 12 MMOL/L — SIGNIFICANT CHANGE UP (ref 5–17)
AST SERPL-CCNC: 46 U/L — HIGH (ref 10–40)
BASOPHILS # BLD AUTO: 0.04 K/UL — SIGNIFICANT CHANGE UP (ref 0–0.2)
BASOPHILS NFR BLD AUTO: 0.2 % — SIGNIFICANT CHANGE UP (ref 0–2)
BILIRUB SERPL-MCNC: 0.8 MG/DL — SIGNIFICANT CHANGE UP (ref 0.2–1.2)
BUN SERPL-MCNC: 18 MG/DL — SIGNIFICANT CHANGE UP (ref 7–23)
CALCIUM SERPL-MCNC: 9.3 MG/DL — SIGNIFICANT CHANGE UP (ref 8.4–10.5)
CHLORIDE SERPL-SCNC: 101 MMOL/L — SIGNIFICANT CHANGE UP (ref 96–108)
CO2 SERPL-SCNC: 25 MMOL/L — SIGNIFICANT CHANGE UP (ref 22–31)
CREAT SERPL-MCNC: 0.59 MG/DL — SIGNIFICANT CHANGE UP (ref 0.5–1.3)
CRP SERPL-MCNC: 6.53 MG/DL — HIGH (ref 0–0.4)
EOSINOPHIL # BLD AUTO: 0 K/UL — SIGNIFICANT CHANGE UP (ref 0–0.5)
EOSINOPHIL NFR BLD AUTO: 0 % — SIGNIFICANT CHANGE UP (ref 0–6)
FERRITIN SERPL-MCNC: 1130 NG/ML — HIGH (ref 30–400)
GLUCOSE BLDC GLUCOMTR-MCNC: 263 MG/DL — HIGH (ref 70–99)
GLUCOSE BLDC GLUCOMTR-MCNC: 264 MG/DL — HIGH (ref 70–99)
GLUCOSE BLDC GLUCOMTR-MCNC: 301 MG/DL — HIGH (ref 70–99)
GLUCOSE BLDC GLUCOMTR-MCNC: 338 MG/DL — HIGH (ref 70–99)
GLUCOSE SERPL-MCNC: 279 MG/DL — HIGH (ref 70–99)
HCT VFR BLD CALC: 43.9 % — SIGNIFICANT CHANGE UP (ref 39–50)
HGB BLD-MCNC: 14.6 G/DL — SIGNIFICANT CHANGE UP (ref 13–17)
IMM GRANULOCYTES NFR BLD AUTO: 0.8 % — SIGNIFICANT CHANGE UP (ref 0–1.5)
LYMPHOCYTES # BLD AUTO: 42.8 % — SIGNIFICANT CHANGE UP (ref 13–44)
LYMPHOCYTES # BLD AUTO: 9.06 K/UL — HIGH (ref 1–3.3)
MCHC RBC-ENTMCNC: 29.1 PG — SIGNIFICANT CHANGE UP (ref 27–34)
MCHC RBC-ENTMCNC: 33.3 GM/DL — SIGNIFICANT CHANGE UP (ref 32–36)
MCV RBC AUTO: 87.6 FL — SIGNIFICANT CHANGE UP (ref 80–100)
MONOCYTES # BLD AUTO: 0.47 K/UL — SIGNIFICANT CHANGE UP (ref 0–0.9)
MONOCYTES NFR BLD AUTO: 2.2 % — SIGNIFICANT CHANGE UP (ref 2–14)
NEUTROPHILS # BLD AUTO: 11.42 K/UL — HIGH (ref 1.8–7.4)
NEUTROPHILS NFR BLD AUTO: 54 % — SIGNIFICANT CHANGE UP (ref 43–77)
NRBC # BLD: 0 /100 WBCS — SIGNIFICANT CHANGE UP (ref 0–0)
PLATELET # BLD AUTO: 258 K/UL — SIGNIFICANT CHANGE UP (ref 150–400)
POTASSIUM SERPL-MCNC: 4.1 MMOL/L — SIGNIFICANT CHANGE UP (ref 3.5–5.3)
POTASSIUM SERPL-SCNC: 4.1 MMOL/L — SIGNIFICANT CHANGE UP (ref 3.5–5.3)
PROCALCITONIN SERPL-MCNC: 0.04 NG/ML — SIGNIFICANT CHANGE UP
PROT SERPL-MCNC: 7.4 G/DL — SIGNIFICANT CHANGE UP (ref 6–8.3)
RBC # BLD: 5.01 M/UL — SIGNIFICANT CHANGE UP (ref 4.2–5.8)
RBC # FLD: 13.3 % — SIGNIFICANT CHANGE UP (ref 10.3–14.5)
SODIUM SERPL-SCNC: 138 MMOL/L — SIGNIFICANT CHANGE UP (ref 135–145)
WBC # BLD: 21.15 K/UL — HIGH (ref 3.8–10.5)
WBC # FLD AUTO: 21.15 K/UL — HIGH (ref 3.8–10.5)

## 2020-04-09 PROCEDURE — 99233 SBSQ HOSP IP/OBS HIGH 50: CPT

## 2020-04-09 RX ORDER — INSULIN LISPRO 100/ML
5 VIAL (ML) SUBCUTANEOUS
Refills: 0 | Status: DISCONTINUED | OUTPATIENT
Start: 2020-04-09 | End: 2020-04-11

## 2020-04-09 RX ORDER — ZINC SULFATE TAB 220 MG (50 MG ZINC EQUIVALENT) 220 (50 ZN) MG
220 TAB ORAL DAILY
Refills: 0 | Status: DISCONTINUED | OUTPATIENT
Start: 2020-04-09 | End: 2020-04-13

## 2020-04-09 RX ADMIN — Medication 35 MILLIGRAM(S): at 17:16

## 2020-04-09 RX ADMIN — Medication 35 MILLIGRAM(S): at 04:37

## 2020-04-09 RX ADMIN — Medication 6: at 17:16

## 2020-04-09 RX ADMIN — Medication 200 MILLIGRAM(S): at 17:17

## 2020-04-09 RX ADMIN — PIPERACILLIN AND TAZOBACTAM 25 GRAM(S): 4; .5 INJECTION, POWDER, LYOPHILIZED, FOR SOLUTION INTRAVENOUS at 03:55

## 2020-04-09 RX ADMIN — ENOXAPARIN SODIUM 40 MILLIGRAM(S): 100 INJECTION SUBCUTANEOUS at 17:17

## 2020-04-09 RX ADMIN — ZINC SULFATE TAB 220 MG (50 MG ZINC EQUIVALENT) 220 MILLIGRAM(S): 220 (50 ZN) TAB at 17:17

## 2020-04-09 RX ADMIN — Medication 4: at 21:55

## 2020-04-09 RX ADMIN — TUBERCULIN PURIFIED PROTEIN DERIVATIVE 5 UNIT(S): 5 INJECTION, SOLUTION INTRADERMAL at 17:51

## 2020-04-09 RX ADMIN — Medication 200 MILLIGRAM(S): at 12:36

## 2020-04-09 RX ADMIN — Medication 8: at 12:36

## 2020-04-09 RX ADMIN — Medication 6: at 07:58

## 2020-04-09 RX ADMIN — ENOXAPARIN SODIUM 40 MILLIGRAM(S): 100 INJECTION SUBCUTANEOUS at 04:34

## 2020-04-09 RX ADMIN — Medication 200 MILLIGRAM(S): at 04:35

## 2020-04-09 RX ADMIN — Medication 5 UNIT(S): at 17:17

## 2020-04-09 RX ADMIN — PANTOPRAZOLE SODIUM 40 MILLIGRAM(S): 20 TABLET, DELAYED RELEASE ORAL at 04:35

## 2020-04-09 RX ADMIN — INSULIN GLARGINE 10 UNIT(S): 100 INJECTION, SOLUTION SUBCUTANEOUS at 21:54

## 2020-04-09 RX ADMIN — PIPERACILLIN AND TAZOBACTAM 25 GRAM(S): 4; .5 INJECTION, POWDER, LYOPHILIZED, FOR SOLUTION INTRAVENOUS at 09:52

## 2020-04-09 NOTE — PROGRESS NOTE ADULT - SUBJECTIVE AND OBJECTIVE BOX
Patient is a 69y old  Male who presents with a chief complaint of fever, cough, hypoxia (08 Apr 2020 10:55)    Interval Hx  Patient seen and examined at bedside. Pt remains sob, still requiring NRB satting 95% on NRB.      ALLERGIES:  No Known Allergies    MEDICATIONS  (STANDING):  dextrose 5%. 1000 milliLiter(s) (50 mL/Hr) IV Continuous <Continuous>  dextrose 50% Injectable 12.5 Gram(s) IV Push once  dextrose 50% Injectable 25 Gram(s) IV Push once  dextrose 50% Injectable 25 Gram(s) IV Push once  enoxaparin Injectable 40 milliGRAM(s) SubCutaneous every 12 hours  hydroxychloroquine   Oral   hydroxychloroquine 200 milliGRAM(s) Oral every 12 hours  insulin glargine Injectable (LANTUS) 10 Unit(s) SubCutaneous at bedtime  insulin lispro (HumaLOG) corrective regimen sliding scale   SubCutaneous three times a day before meals  insulin lispro (HumaLOG) corrective regimen sliding scale   SubCutaneous at bedtime  methylPREDNISolone sodium succinate Injectable 35 milliGRAM(s) IV Push every 12 hours  pantoprazole    Tablet 40 milliGRAM(s) Oral before breakfast  piperacillin/tazobactam IVPB.. 3.375 Gram(s) IV Intermittent every 8 hours  thiamine 200 milliGRAM(s) Oral daily    MEDICATIONS  (PRN):  acetaminophen   Tablet .. 650 milliGRAM(s) Oral every 4 hours PRN Temp greater or equal to 38.5C (101.3F)  acetaminophen  Suppository .. 650 milliGRAM(s) Rectal every 4 hours PRN Temp greater or equal to 38.5C (101.3F)  ALBUTerol    90 MICROgram(s) HFA Inhaler 2 Puff(s) Inhalation every 4 hours PRN Shortness of Breath and/or Wheezing  dextrose 40% Gel 15 Gram(s) Oral once PRN Blood Glucose LESS THAN 70 milliGRAM(s)/deciliter  glucagon  Injectable 1 milliGRAM(s) IntraMuscular once PRN Glucose LESS THAN 70 milligrams/deciliter  guaifenesin/dextromethorphan  Syrup 10 milliLiter(s) Oral every 4 hours PRN Cough  melatonin 3 milliGRAM(s) Oral at bedtime PRN Insomnia    Vital Signs Last 24 Hrs  T(F): --  HR: --  BP: 113/64 (09 Apr 2020 09:33) (113/64 - 113/64)  RR: --  SpO2: --  I&O's Summary    08 Apr 2020 07:01  -  09 Apr 2020 07:00  --------------------------------------------------------  IN: 0 mL / OUT: 400 mL / NET: -400 mL      PHYSICAL EXAM:  General: NAD, A/O x 3  ENT: MMM, no thrush  Neck: Supple, No JVD  Lungs: B/L scattered crackles, decreased breath sounds b/l  Cardio: RRR, S1/S2, No murmur  Abdomen: Soft, Nontender, Nondistended; Bowel sounds present  Extremities: No calf tenderness, No pitting edema    LABS:                        14.6   21.15 )-----------( 258      ( 09 Apr 2020 07:31 )             43.9     04-09    138  |  101  |  18  ----------------------------<  279  4.1   |  25  |  0.59    Ca    9.3      09 Apr 2020 07:31    TPro  7.4  /  Alb  3.1  /  TBili  0.8  /  DBili  x   /  AST  46  /  ALT  77  /  AlkPhos  91  04-09        eGFR if Non African American: 104 mL/min/1.73M2 (04-09-20 @ 07:31)  eGFR if African American: 120 mL/min/1.73M2 (04-09-20 @ 07:31)        Procalcitonin, Serum: 0.04 ng/mL (04-09-20 @ 07:31)  Procalcitonin, Serum: 0.17 ng/mL (04-07-20 @ 07:54)    CARDIAC MARKERS ( 07 Apr 2020 07:54 )  <.017 ng/mL / x     / x     / x     / x                        POCT Blood Glucose.: 338 mg/dL (09 Apr 2020 12:19)  POCT Blood Glucose.: 263 mg/dL (09 Apr 2020 07:54)  POCT Blood Glucose.: 298 mg/dL (08 Apr 2020 22:20)  POCT Blood Glucose.: 319 mg/dL (08 Apr 2020 17:08)  POCT Blood Glucose.: 357 mg/dL (08 Apr 2020 14:51)    04-07 [id number] 8.8        Culture - Blood (collected 06 Apr 2020 03:18)  Source: .Blood Blood-Peripheral  Preliminary Report (07 Apr 2020 09:01):    No growth to date.    Culture - Blood (collected 06 Apr 2020 03:18)  Source: .Blood Blood-Peripheral  Preliminary Report (07 Apr 2020 09:01):    No growth to date.      RADIOLOGY & ADDITIONAL TESTS:    Care Discussed with Consultants/Other Providers:

## 2020-04-09 NOTE — PROGRESS NOTE ADULT - ASSESSMENT
69M hx of CLL, NIDDM p/w fever, cough, hypoxia with bl infiltrates on CXR; admitted for acute hypoxic respiratory distress due to COVID Penumonia    # Acute hypoxic respiratory distress due to COVID Penumonia  still sob requiring NRB to sat 94%  cont plaquinil  zinc/thiamine/mv  C/w Solumedrol 35 mg IVP BID   check biomarkers in AM    #leukocytosis likely related to steroids   patient has been afebrile    #T2DM  Lantus and premeals insulin added due to uncontrolled hyperglycemia  cont ISS  monitor fingersticks    #DVT ppx  family updated

## 2020-04-10 LAB
ANION GAP SERPL CALC-SCNC: 12 MMOL/L — SIGNIFICANT CHANGE UP (ref 5–17)
BASOPHILS # BLD AUTO: 0.04 K/UL — SIGNIFICANT CHANGE UP (ref 0–0.2)
BASOPHILS NFR BLD AUTO: 0.2 % — SIGNIFICANT CHANGE UP (ref 0–2)
BUN SERPL-MCNC: 20 MG/DL — SIGNIFICANT CHANGE UP (ref 7–23)
CALCIUM SERPL-MCNC: 9 MG/DL — SIGNIFICANT CHANGE UP (ref 8.4–10.5)
CHLORIDE SERPL-SCNC: 102 MMOL/L — SIGNIFICANT CHANGE UP (ref 96–108)
CO2 SERPL-SCNC: 26 MMOL/L — SIGNIFICANT CHANGE UP (ref 22–31)
CREAT SERPL-MCNC: 0.59 MG/DL — SIGNIFICANT CHANGE UP (ref 0.5–1.3)
CRP SERPL-MCNC: 3.77 MG/DL — HIGH (ref 0–0.4)
CULTURE RESULTS: SIGNIFICANT CHANGE UP
CULTURE RESULTS: SIGNIFICANT CHANGE UP
D DIMER BLD IA.RAPID-MCNC: 761 NG/ML DDU — HIGH
EOSINOPHIL # BLD AUTO: 0 K/UL — SIGNIFICANT CHANGE UP (ref 0–0.5)
EOSINOPHIL NFR BLD AUTO: 0 % — SIGNIFICANT CHANGE UP (ref 0–6)
FERRITIN SERPL-MCNC: 897 NG/ML — HIGH (ref 30–400)
GLUCOSE BLDC GLUCOMTR-MCNC: 239 MG/DL — HIGH (ref 70–99)
GLUCOSE BLDC GLUCOMTR-MCNC: 252 MG/DL — HIGH (ref 70–99)
GLUCOSE BLDC GLUCOMTR-MCNC: 261 MG/DL — HIGH (ref 70–99)
GLUCOSE BLDC GLUCOMTR-MCNC: 307 MG/DL — HIGH (ref 70–99)
GLUCOSE SERPL-MCNC: 202 MG/DL — HIGH (ref 70–99)
HCT VFR BLD CALC: 43.5 % — SIGNIFICANT CHANGE UP (ref 39–50)
HGB BLD-MCNC: 14.6 G/DL — SIGNIFICANT CHANGE UP (ref 13–17)
IMM GRANULOCYTES NFR BLD AUTO: 1.3 % — SIGNIFICANT CHANGE UP (ref 0–1.5)
LDH SERPL L TO P-CCNC: 490 U/L — HIGH (ref 50–242)
LYMPHOCYTES # BLD AUTO: 44.4 % — HIGH (ref 13–44)
LYMPHOCYTES # BLD AUTO: 9.77 K/UL — HIGH (ref 1–3.3)
MCHC RBC-ENTMCNC: 29.4 PG — SIGNIFICANT CHANGE UP (ref 27–34)
MCHC RBC-ENTMCNC: 33.6 GM/DL — SIGNIFICANT CHANGE UP (ref 32–36)
MCV RBC AUTO: 87.5 FL — SIGNIFICANT CHANGE UP (ref 80–100)
MONOCYTES # BLD AUTO: 0.48 K/UL — SIGNIFICANT CHANGE UP (ref 0–0.9)
MONOCYTES NFR BLD AUTO: 2.2 % — SIGNIFICANT CHANGE UP (ref 2–14)
NEUTROPHILS # BLD AUTO: 11.43 K/UL — HIGH (ref 1.8–7.4)
NEUTROPHILS NFR BLD AUTO: 51.9 % — SIGNIFICANT CHANGE UP (ref 43–77)
NRBC # BLD: 0 /100 WBCS — SIGNIFICANT CHANGE UP (ref 0–0)
PLATELET # BLD AUTO: 299 K/UL — SIGNIFICANT CHANGE UP (ref 150–400)
POTASSIUM SERPL-MCNC: 4.2 MMOL/L — SIGNIFICANT CHANGE UP (ref 3.5–5.3)
POTASSIUM SERPL-SCNC: 4.2 MMOL/L — SIGNIFICANT CHANGE UP (ref 3.5–5.3)
PROCALCITONIN SERPL-MCNC: 0.03 NG/ML — SIGNIFICANT CHANGE UP
RBC # BLD: 4.97 M/UL — SIGNIFICANT CHANGE UP (ref 4.2–5.8)
RBC # FLD: 13.2 % — SIGNIFICANT CHANGE UP (ref 10.3–14.5)
SODIUM SERPL-SCNC: 140 MMOL/L — SIGNIFICANT CHANGE UP (ref 135–145)
SPECIMEN SOURCE: SIGNIFICANT CHANGE UP
SPECIMEN SOURCE: SIGNIFICANT CHANGE UP
WBC # BLD: 22 K/UL — HIGH (ref 3.8–10.5)
WBC # FLD AUTO: 22 K/UL — HIGH (ref 3.8–10.5)

## 2020-04-10 PROCEDURE — 99233 SBSQ HOSP IP/OBS HIGH 50: CPT

## 2020-04-10 RX ORDER — INSULIN GLARGINE 100 [IU]/ML
15 INJECTION, SOLUTION SUBCUTANEOUS AT BEDTIME
Refills: 0 | Status: DISCONTINUED | OUTPATIENT
Start: 2020-04-10 | End: 2020-04-11

## 2020-04-10 RX ADMIN — Medication 5 UNIT(S): at 16:25

## 2020-04-10 RX ADMIN — Medication 200 MILLIGRAM(S): at 12:21

## 2020-04-10 RX ADMIN — INSULIN GLARGINE 15 UNIT(S): 100 INJECTION, SOLUTION SUBCUTANEOUS at 20:49

## 2020-04-10 RX ADMIN — Medication 35 MILLIGRAM(S): at 16:26

## 2020-04-10 RX ADMIN — Medication 4: at 20:55

## 2020-04-10 RX ADMIN — Medication 6: at 12:20

## 2020-04-10 RX ADMIN — Medication 35 MILLIGRAM(S): at 05:33

## 2020-04-10 RX ADMIN — Medication 6: at 16:25

## 2020-04-10 RX ADMIN — Medication 1 TABLET(S): at 12:21

## 2020-04-10 RX ADMIN — Medication 4: at 08:09

## 2020-04-10 RX ADMIN — ENOXAPARIN SODIUM 40 MILLIGRAM(S): 100 INJECTION SUBCUTANEOUS at 05:32

## 2020-04-10 RX ADMIN — Medication 5 UNIT(S): at 08:08

## 2020-04-10 RX ADMIN — Medication 200 MILLIGRAM(S): at 16:26

## 2020-04-10 RX ADMIN — Medication 5 UNIT(S): at 12:20

## 2020-04-10 RX ADMIN — Medication 200 MILLIGRAM(S): at 05:33

## 2020-04-10 RX ADMIN — ZINC SULFATE TAB 220 MG (50 MG ZINC EQUIVALENT) 220 MILLIGRAM(S): 220 (50 ZN) TAB at 12:21

## 2020-04-10 RX ADMIN — PANTOPRAZOLE SODIUM 40 MILLIGRAM(S): 20 TABLET, DELAYED RELEASE ORAL at 05:33

## 2020-04-10 RX ADMIN — ENOXAPARIN SODIUM 40 MILLIGRAM(S): 100 INJECTION SUBCUTANEOUS at 16:26

## 2020-04-10 NOTE — PROGRESS NOTE ADULT - ASSESSMENT
69M hx of CLL, NIDDM p/w fever, cough, hypoxia with bl infiltrates on CXR; admitted for acute hypoxic respiratory distress due to COVID Penumonia    -Acute hypoxic respiratory distress due to COVID Penumonia  still requiring NRB to sat 93%, will attempt to wean to nasal canula 8-10L as tolerated today  complete Plaquinil #5/5 today  zinc/thiamine/mv  C/w Solumedrol 35 mg IVP BID   plats wnl (299K)  Continue to trend [id number] upward trend (761), Ferritin descending (897), , procal wnl    -leukocytosis   CLL/steroids   patient has been afebrile    -T2DM  -338  Lantus and premeals insulin added due to uncontrolled hyperglycemia  cont ISS  monitor fingersticks    #DVT ppx  family updated 69M hx of CLL, NIDDM p/w fever, cough, hypoxia with bl infiltrates on CXR; admitted for acute hypoxic respiratory distress due to COVID Penumonia    -Acute hypoxic respiratory distress due to COVID Penumonia  still requiring NRB to sat 93%, will attempt to wean to nasal canula 8-10L as tolerated today  complete Plaquinil #5/5 today  zinc/thiamine/mv  C/w Solumedrol 35 mg IVP BID   plats wnl (299K)  Continue to trend [id number] upward trend (761), Ferritin descending (897), , procal wnl    -leukocytosis likely due to steroids  patient has been afebrile    -T2DM  -338  Lantus and premeals insulin added due to uncontrolled hyperglycemia  cont ISS  monitor fingersticks    #DVT ppx  family updated

## 2020-04-10 NOTE — PROGRESS NOTE ADULT - SUBJECTIVE AND OBJECTIVE BOX
Patient is a 69y old  Male who presents with a chief complaint of fever, cough, hypoxia (09 Apr 2020 14:05)      Patient seen and examined at bedside by Dr. Stone; requiring non-rebreather @ 15L for O2 sats 91%; remains on IV Solumedrol and Plaquenil #5/5 today    ALLERGIES:  No Known Allergies    MEDICATIONS  (STANDING):  dextrose 5%. 1000 milliLiter(s) (50 mL/Hr) IV Continuous <Continuous>  dextrose 50% Injectable 12.5 Gram(s) IV Push once  dextrose 50% Injectable 25 Gram(s) IV Push once  dextrose 50% Injectable 25 Gram(s) IV Push once  enoxaparin Injectable 40 milliGRAM(s) SubCutaneous every 12 hours  hydroxychloroquine   Oral   hydroxychloroquine 200 milliGRAM(s) Oral every 12 hours  insulin glargine Injectable (LANTUS) 10 Unit(s) SubCutaneous at bedtime  insulin lispro (HumaLOG) corrective regimen sliding scale   SubCutaneous three times a day before meals  insulin lispro (HumaLOG) corrective regimen sliding scale   SubCutaneous at bedtime  insulin lispro Injectable (HumaLOG) 5 Unit(s) SubCutaneous three times a day before meals  methylPREDNISolone sodium succinate Injectable 35 milliGRAM(s) IV Push every 12 hours  multivitamin 1 Tablet(s) Oral daily  pantoprazole    Tablet 40 milliGRAM(s) Oral before breakfast  thiamine 200 milliGRAM(s) Oral daily  zinc sulfate 220 milliGRAM(s) Oral daily    MEDICATIONS  (PRN):  acetaminophen   Tablet .. 650 milliGRAM(s) Oral every 4 hours PRN Temp greater or equal to 38.5C (101.3F)  acetaminophen  Suppository .. 650 milliGRAM(s) Rectal every 4 hours PRN Temp greater or equal to 38.5C (101.3F)  ALBUTerol    90 MICROgram(s) HFA Inhaler 2 Puff(s) Inhalation every 4 hours PRN Shortness of Breath and/or Wheezing  dextrose 40% Gel 15 Gram(s) Oral once PRN Blood Glucose LESS THAN 70 milliGRAM(s)/deciliter  glucagon  Injectable 1 milliGRAM(s) IntraMuscular once PRN Glucose LESS THAN 70 milligrams/deciliter  guaifenesin/dextromethorphan  Syrup 10 milliLiter(s) Oral every 4 hours PRN Cough  melatonin 3 milliGRAM(s) Oral at bedtime PRN Insomnia    Vital Signs Last 24 Hrs  T(F): 98.6 (10 Apr 2020 04:16), Max: 98.6 (10 Apr 2020 04:16)  HR: 90 (10 Apr 2020 04:16) (82 - 90)  BP: 162/93 (10 Apr 2020 04:16) (132/56 - 162/93)  RR: 21 (10 Apr 2020 04:16) (18 - 21)  SpO2: 91% (10 Apr 2020 04:16) (91% - 93%)  I&O's Summary    09 Apr 2020 07:01  -  10 Apr 2020 07:00  --------------------------------------------------------  IN: 360 mL / OUT: 600 mL / NET: -240 mL      BMI (kg/m2): 25.8 (04-07-20 @ 08:43)  PHYSICAL EXAM: T 98.6, afebrile p90, /93, RR 20 O2 sats 91-93%  General: NAD, A/O x 3  ENT: MMM  Neck: Supple, No JVD  Lungs: Clear to auscultation bilaterally  Cardio: RRR, S1/S2, No murmurs  Abdomen: Soft, Nontender, Nondistended; Bowel sounds present  Extremities: No calf tenderness, No pitting edema    LABS:                        14.6   22.00 )-----------( 299      ( 10 Apr 2020 08:04 )             43.5       04-10    140  |  102  |  20  ----------------------------<  202  4.2   |  26  |  0.59    Ca    9.0      10 Apr 2020 08:04    TPro  7.4  /  Alb  3.1  /  TBili  0.8  /  DBili  x   /  AST  46  /  ALT  77  /  AlkPhos  91  04-09     eGFR if Non African American: 104 mL/min/1.73M2 (04-10-20 @ 08:04)  eGFR if African American: 121 mL/min/1.73M2 (04-10-20 @ 08:04)       POCT Blood Glucose.: 261 mg/dL (10 Apr 2020 12:19)  POCT Blood Glucose.: 239 mg/dL (10 Apr 2020 08:05)  POCT Blood Glucose.: 301 mg/dL (09 Apr 2020 21:52)  POCT Blood Glucose.: 264 mg/dL (09 Apr 2020 16:42)    04-07 [id number] 8.8    Culture - Blood (collected 06 Apr 2020 03:18)  Source: .Blood Blood-Peripheral  Final Report (10 Apr 2020 09:01):    No Growth Final    Culture - Blood (collected 06 Apr 2020 03:18)  Source: .Blood Blood-Peripheral  Final Report (10 Apr 2020 09:01):    No Growth Final    RADIOLOGY & ADDITIONAL TESTS:    Care Discussed with Consultants/Other Providers: Patient is a 69y old  Male who presents with a chief complaint of fever, cough, hypoxia (09 Apr 2020 14:05)    Interval Hx  Patient seen and examined at bedside ; requiring non-rebreather @ 15L for O2 sats 91%; remains on IV Solumedrol and Plaquenil #5/5 today  Denies having any symptoms.    ALLERGIES:  No Known Allergies    MEDICATIONS  (STANDING):  dextrose 5%. 1000 milliLiter(s) (50 mL/Hr) IV Continuous <Continuous>  dextrose 50% Injectable 12.5 Gram(s) IV Push once  dextrose 50% Injectable 25 Gram(s) IV Push once  dextrose 50% Injectable 25 Gram(s) IV Push once  enoxaparin Injectable 40 milliGRAM(s) SubCutaneous every 12 hours  hydroxychloroquine   Oral   hydroxychloroquine 200 milliGRAM(s) Oral every 12 hours  insulin glargine Injectable (LANTUS) 10 Unit(s) SubCutaneous at bedtime  insulin lispro (HumaLOG) corrective regimen sliding scale   SubCutaneous three times a day before meals  insulin lispro (HumaLOG) corrective regimen sliding scale   SubCutaneous at bedtime  insulin lispro Injectable (HumaLOG) 5 Unit(s) SubCutaneous three times a day before meals  methylPREDNISolone sodium succinate Injectable 35 milliGRAM(s) IV Push every 12 hours  multivitamin 1 Tablet(s) Oral daily  pantoprazole    Tablet 40 milliGRAM(s) Oral before breakfast  thiamine 200 milliGRAM(s) Oral daily  zinc sulfate 220 milliGRAM(s) Oral daily    MEDICATIONS  (PRN):  acetaminophen   Tablet .. 650 milliGRAM(s) Oral every 4 hours PRN Temp greater or equal to 38.5C (101.3F)  acetaminophen  Suppository .. 650 milliGRAM(s) Rectal every 4 hours PRN Temp greater or equal to 38.5C (101.3F)  ALBUTerol    90 MICROgram(s) HFA Inhaler 2 Puff(s) Inhalation every 4 hours PRN Shortness of Breath and/or Wheezing  dextrose 40% Gel 15 Gram(s) Oral once PRN Blood Glucose LESS THAN 70 milliGRAM(s)/deciliter  glucagon  Injectable 1 milliGRAM(s) IntraMuscular once PRN Glucose LESS THAN 70 milligrams/deciliter  guaifenesin/dextromethorphan  Syrup 10 milliLiter(s) Oral every 4 hours PRN Cough  melatonin 3 milliGRAM(s) Oral at bedtime PRN Insomnia    Vital Signs Last 24 Hrs  T(F): 98.6 (10 Apr 2020 04:16), Max: 98.6 (10 Apr 2020 04:16)  HR: 90 (10 Apr 2020 04:16) (82 - 90)  BP: 162/93 (10 Apr 2020 04:16) (132/56 - 162/93)  RR: 21 (10 Apr 2020 04:16) (18 - 21)  SpO2: 91% (10 Apr 2020 04:16) (91% - 93%)  I&O's Summary    09 Apr 2020 07:01  -  10 Apr 2020 07:00  --------------------------------------------------------  IN: 360 mL / OUT: 600 mL / NET: -240 mL      BMI (kg/m2): 25.8 (04-07-20 @ 08:43)  PHYSICAL EXAM: T 98.6, afebrile p90, /93, RR 20 O2 sats 91-93%  General: NAD, A/O x 3  ENT: MMM  Neck: Supple, No JVD  Lungs: B/L scattered crackles, poor inspiratory effort  Cardio: RRR, S1/S2, No murmurs  Abdomen: Soft, Nontender, Nondistended; Bowel sounds present  Extremities: No calf tenderness, No pitting edema    LABS:                        14.6   22.00 )-----------( 299      ( 10 Apr 2020 08:04 )             43.5       04-10    140  |  102  |  20  ----------------------------<  202  4.2   |  26  |  0.59    Ca    9.0      10 Apr 2020 08:04    TPro  7.4  /  Alb  3.1  /  TBili  0.8  /  DBili  x   /  AST  46  /  ALT  77  /  AlkPhos  91  04-09     eGFR if Non African American: 104 mL/min/1.73M2 (04-10-20 @ 08:04)  eGFR if African American: 121 mL/min/1.73M2 (04-10-20 @ 08:04)       POCT Blood Glucose.: 261 mg/dL (10 Apr 2020 12:19)  POCT Blood Glucose.: 239 mg/dL (10 Apr 2020 08:05)  POCT Blood Glucose.: 301 mg/dL (09 Apr 2020 21:52)  POCT Blood Glucose.: 264 mg/dL (09 Apr 2020 16:42)    04-07 [id number] 8.8    Culture - Blood (collected 06 Apr 2020 03:18)  Source: .Blood Blood-Peripheral  Final Report (10 Apr 2020 09:01):    No Growth Final    Culture - Blood (collected 06 Apr 2020 03:18)  Source: .Blood Blood-Peripheral  Final Report (10 Apr 2020 09:01):    No Growth Final    RADIOLOGY & ADDITIONAL TESTS:    Care Discussed with Consultants/Other Providers:

## 2020-04-11 LAB
ANION GAP SERPL CALC-SCNC: 10 MMOL/L — SIGNIFICANT CHANGE UP (ref 5–17)
BASOPHILS # BLD AUTO: 0 K/UL — SIGNIFICANT CHANGE UP (ref 0–0.2)
BASOPHILS NFR BLD AUTO: 0 % — SIGNIFICANT CHANGE UP (ref 0–2)
BUN SERPL-MCNC: 21 MG/DL — SIGNIFICANT CHANGE UP (ref 7–23)
CALCIUM SERPL-MCNC: 9 MG/DL — SIGNIFICANT CHANGE UP (ref 8.4–10.5)
CHLORIDE SERPL-SCNC: 98 MMOL/L — SIGNIFICANT CHANGE UP (ref 96–108)
CO2 SERPL-SCNC: 26 MMOL/L — SIGNIFICANT CHANGE UP (ref 22–31)
CREAT SERPL-MCNC: 0.69 MG/DL — SIGNIFICANT CHANGE UP (ref 0.5–1.3)
D DIMER BLD IA.RAPID-MCNC: 482 NG/ML DDU — HIGH
EOSINOPHIL # BLD AUTO: 0 K/UL — SIGNIFICANT CHANGE UP (ref 0–0.5)
EOSINOPHIL NFR BLD AUTO: 0 % — SIGNIFICANT CHANGE UP (ref 0–6)
GLUCOSE BLDC GLUCOMTR-MCNC: 232 MG/DL — HIGH (ref 70–99)
GLUCOSE BLDC GLUCOMTR-MCNC: 303 MG/DL — HIGH (ref 70–99)
GLUCOSE BLDC GLUCOMTR-MCNC: 304 MG/DL — HIGH (ref 70–99)
GLUCOSE BLDC GLUCOMTR-MCNC: 322 MG/DL — HIGH (ref 70–99)
GLUCOSE SERPL-MCNC: 218 MG/DL — HIGH (ref 70–99)
HCT VFR BLD CALC: 44.5 % — SIGNIFICANT CHANGE UP (ref 39–50)
HGB BLD-MCNC: 14.6 G/DL — SIGNIFICANT CHANGE UP (ref 13–17)
LDH SERPL L TO P-CCNC: 538 U/L — HIGH (ref 50–242)
LYMPHOCYTES # BLD AUTO: 35 % — SIGNIFICANT CHANGE UP (ref 13–44)
LYMPHOCYTES # BLD AUTO: 6.87 K/UL — HIGH (ref 1–3.3)
MANUAL SMEAR VERIFICATION: YES — SIGNIFICANT CHANGE UP
MCHC RBC-ENTMCNC: 28.9 PG — SIGNIFICANT CHANGE UP (ref 27–34)
MCHC RBC-ENTMCNC: 32.8 GM/DL — SIGNIFICANT CHANGE UP (ref 32–36)
MCV RBC AUTO: 87.9 FL — SIGNIFICANT CHANGE UP (ref 80–100)
MONOCYTES # BLD AUTO: 0.59 K/UL — SIGNIFICANT CHANGE UP (ref 0–0.9)
MONOCYTES NFR BLD AUTO: 3 % — SIGNIFICANT CHANGE UP (ref 2–14)
NEUTROPHILS # BLD AUTO: 12.16 K/UL — HIGH (ref 1.8–7.4)
NEUTROPHILS NFR BLD AUTO: 62 % — SIGNIFICANT CHANGE UP (ref 43–77)
NRBC # BLD: 0 /100 — SIGNIFICANT CHANGE UP (ref 0–0)
PLAT MORPH BLD: NORMAL — SIGNIFICANT CHANGE UP
PLATELET # BLD AUTO: 327 K/UL — SIGNIFICANT CHANGE UP (ref 150–400)
POTASSIUM SERPL-MCNC: 4.2 MMOL/L — SIGNIFICANT CHANGE UP (ref 3.5–5.3)
POTASSIUM SERPL-SCNC: 4.2 MMOL/L — SIGNIFICANT CHANGE UP (ref 3.5–5.3)
PROCALCITONIN SERPL-MCNC: 0.06 NG/ML — SIGNIFICANT CHANGE UP
RBC # BLD: 5.06 M/UL — SIGNIFICANT CHANGE UP (ref 4.2–5.8)
RBC # FLD: 13.2 % — SIGNIFICANT CHANGE UP (ref 10.3–14.5)
RBC BLD AUTO: NORMAL — SIGNIFICANT CHANGE UP
SMUDGE CELLS # BLD: PRESENT — SIGNIFICANT CHANGE UP
SODIUM SERPL-SCNC: 134 MMOL/L — LOW (ref 135–145)
WBC # BLD: 19.62 K/UL — HIGH (ref 3.8–10.5)
WBC # FLD AUTO: 19.62 K/UL — HIGH (ref 3.8–10.5)

## 2020-04-11 PROCEDURE — 99232 SBSQ HOSP IP/OBS MODERATE 35: CPT

## 2020-04-11 PROCEDURE — 71045 X-RAY EXAM CHEST 1 VIEW: CPT | Mod: 26

## 2020-04-11 RX ORDER — INSULIN GLARGINE 100 [IU]/ML
18 INJECTION, SOLUTION SUBCUTANEOUS AT BEDTIME
Refills: 0 | Status: DISCONTINUED | OUTPATIENT
Start: 2020-04-11 | End: 2020-04-12

## 2020-04-11 RX ORDER — INSULIN LISPRO 100/ML
6 VIAL (ML) SUBCUTANEOUS
Refills: 0 | Status: DISCONTINUED | OUTPATIENT
Start: 2020-04-11 | End: 2020-04-12

## 2020-04-11 RX ORDER — ROCURONIUM BROMIDE 10 MG/ML
80 VIAL (ML) INTRAVENOUS ONCE
Refills: 0 | Status: COMPLETED | OUTPATIENT
Start: 2020-04-11 | End: 2020-04-12

## 2020-04-11 RX ADMIN — Medication 4: at 08:11

## 2020-04-11 RX ADMIN — Medication 200 MILLIGRAM(S): at 12:08

## 2020-04-11 RX ADMIN — Medication 5 UNIT(S): at 08:11

## 2020-04-11 RX ADMIN — Medication 8: at 12:07

## 2020-04-11 RX ADMIN — Medication 5 UNIT(S): at 12:07

## 2020-04-11 RX ADMIN — ENOXAPARIN SODIUM 40 MILLIGRAM(S): 100 INJECTION SUBCUTANEOUS at 05:34

## 2020-04-11 RX ADMIN — Medication 6 UNIT(S): at 17:24

## 2020-04-11 RX ADMIN — Medication 1 TABLET(S): at 12:09

## 2020-04-11 RX ADMIN — PANTOPRAZOLE SODIUM 40 MILLIGRAM(S): 20 TABLET, DELAYED RELEASE ORAL at 05:34

## 2020-04-11 RX ADMIN — ENOXAPARIN SODIUM 40 MILLIGRAM(S): 100 INJECTION SUBCUTANEOUS at 17:24

## 2020-04-11 RX ADMIN — ZINC SULFATE TAB 220 MG (50 MG ZINC EQUIVALENT) 220 MILLIGRAM(S): 220 (50 ZN) TAB at 12:09

## 2020-04-11 RX ADMIN — Medication 8: at 17:24

## 2020-04-11 RX ADMIN — Medication 35 MILLIGRAM(S): at 05:34

## 2020-04-11 NOTE — DIETITIAN INITIAL EVALUATION ADULT. - ENERGY NEEDS
Ht: (167.6 cm)  5' 6"  Wt:  160 Lbs  (72.6 Kg)    IBW:  136 Lbs +/- 10%    %IBW:   118%    BMI: 25.8                    Edema: none        Skin:   WDL

## 2020-04-11 NOTE — PROGRESS NOTE ADULT - ASSESSMENT
69M hx of CLL, NIDDM p/w fever, cough, hypoxia with bl infiltrates on CXR; admitted for acute hypoxic respiratory distress due to COVID Penumonia    -Acute hypoxic respiratory distress due to COVID Penumonia  still requiring NRB to sat 86%, ICU c/s requested, biomarkers ordered, including IL-6, CXR pending  Plaquenil course completed   zinc/thiamine/mv  c/w Solumedrol 35 mg IVP q 12 hours   Continue to trend biomarkers    -leukocytosis likely due to steroids  trending down     -T2DM  -338  Lantus and premeals insulin added due to uncontrolled hyperglycemia  cont ISS  monitor fingersticks    #DVT ppx  family updated

## 2020-04-11 NOTE — PROGRESS NOTE ADULT - ATTENDING COMMENTS
I have personally seen and examined patient on the above date.  I discussed the case with LUZ Shah and I agree with findings and plan as detailed per note above, which I have amended where appropriate.    Pt seen and examined at bedside, pt still sob, satting 85-90% on NRB, tachycardic and low dose temp.  case d/w Intensivist--- will keep pt on prone position, send biomarkers including IL-6 ,  cxr and reassess .check blood cx. If spikes fever will consider antibiotics  d/c steroids 5/5 days completed .  Lantus and premeals adjusted for uncontrolled hyperglycemia, monitor fingersticks.
I have personally seen and examined patient on the above date.  I discussed the case with LUZ Telles and I agree with findings and plan as detailed per note above, which I have amended where appropriate.    #COVID 19 PNA  complete plaquinil course  titrate NRB to 02 and gradually wean off o2 as tolerated  cont iv steroids day 5/5, will d/c tomorrow  f/u biomarkers  #Uncontrolled Hyperglycemia: increased lantus dose to 15U , premeals added yesterday, will monitor fingersticks

## 2020-04-11 NOTE — DIETITIAN INITIAL EVALUATION ADULT. - OTHER INFO
69M hx of CLL, NIDDM p/w fever, cough, hypoxia with bl infiltrates on CXR; admitted for acute hypoxic respiratory distress due to COVID Penumonia. 69M hx of CLL, NIDDM p/w fever, cough, hypoxia with bl infiltrates on CXR; admitted for acute hypoxic respiratory distress due to COVID Penumonia.   As per RN, Pt reports consuming a regular diet PTA. Does not follow any restriction. Pt feed self, no chewing/swallowing difficulties. Denies N/V/D/C at the moment. Last BM: 04/11.  NKFA, no edema, skin WDL. Pt reports UBW: 160 Lbs, current weight: 160 Lbs. Pt has PMH of CM, labs from 04/07/20 w/ HgA1C: 8.8. Diabetes therapeutic education provided in written form. 69M hx of CLL, NIDDM p/w fever, cough, hypoxia with bl infiltrates on CXR; admitted for acute hypoxic respiratory distress due to COVID Penumonia.   As per RN, Pt reports consuming a regular diet PTA. Does not follow any restriction. Pt feed self, no chewing/swallowing difficulties. Denies N/V/D/C at the moment. Last BM: 04/11.  NKFA, no edema, skin WDL. Pt reports UBW: 160 Lbs, current weight: 160 Lbs. Pt has PMH of DMT2, labs from 04/07/20 w/ HgA1C: 8.8. Diabetes therapeutic education provided in written form.

## 2020-04-11 NOTE — PROGRESS NOTE ADULT - SUBJECTIVE AND OBJECTIVE BOX
Patient is a 69y old  Male who presents with a chief complaint of fever, cough, hypoxia, COVID +  pt with low grade temperature this am, tachycardic to 120, satting 85 % NRB    Patient seen and examined at bedside by Dr. Stone    ALLERGIES:  No Known Allergies    MEDICATIONS  (STANDING):  enoxaparin Injectable 40 milliGRAM(s) SubCutaneous every 12 hours  insulin glargine Injectable (LANTUS) 18 Unit(s) SubCutaneous at bedtime  methylPREDNISolone sodium succinate Injectable 35 milliGRAM(s) IV Push every 12 hours  multivitamin 1 Tablet(s) Oral daily  pantoprazole    Tablet 40 milliGRAM(s) Oral before breakfast  thiamine 200 milliGRAM(s) Oral daily  zinc sulfate 220 milliGRAM(s) Oral daily    MEDICATIONS  (PRN):  acetaminophen   Tablet .. 650 milliGRAM(s) Oral every 4 hours PRN Temp greater or equal to 38.5C (101.3F)  acetaminophen  Suppository .. 650 milliGRAM(s) Rectal every 4 hours PRN Temp greater or equal to 38.5C (101.3F)  ALBUTerol    90 MICROgram(s) HFA Inhaler 2 Puff(s) Inhalation every 4 hours PRN Shortness of Breath and/or Wheezing  dextrose 40% Gel 15 Gram(s) Oral once PRN Blood Glucose LESS THAN 70 milliGRAM(s)/deciliter  glucagon  Injectable 1 milliGRAM(s) IntraMuscular once PRN Glucose LESS THAN 70 milligrams/deciliter  guaifenesin/dextromethorphan  Syrup 10 milliLiter(s) Oral every 4 hours PRN Cough  melatonin 3 milliGRAM(s) Oral at bedtime PRN Insomnia    Vital Signs Last 24 Hrs  T(F): 100.4 (11 Apr 2020 05:42), Max: 100.4 (11 Apr 2020 05:42)  HR: 120 (11 Apr 2020 05:42) (120 - 120)  BP: 111/68 (11 Apr 2020 05:42) (111/68 - 111/68)  RR: 21 (11 Apr 2020 05:42) (21 - 21)  SpO2: 86% on NRB (11 Apr 2020 05:42) (86% - 86%)  I&O's Summary    PHYSICAL EXAM:  General: NAD, A/O x 3  ENT: MMM  Neck: Supple, No JVD  Lungs: Clear to auscultation bilaterally  Cardio: RRR, S1/S2, No murmurs  Abdomen: Soft, Nontender, Nondistended; Bowel sounds present  Extremities: No calf tenderness, No pitting edema    LABS:                        14.6   19.62 )-----------( 327      ( 11 Apr 2020 06:30 )             44.5     04-11    134  |  98  |  21  ----------------------------<  218  4.2   |  26  |  0.69    Ca    9.0      11 Apr 2020 06:30    TPro  7.4  /  Alb  3.1  /  TBili  0.8  /  DBili  x   /  AST  46  /  ALT  77  /  AlkPhos  91  04-09    eGFR if Non African American: 98 mL/min/1.73M2 (04-11-20 @ 06:30)  eGFR if : 113 mL/min/1.73M2 (04-11-20 @ 06:30)    04-07 [id number] 8.8    Culture - Blood (collected 06 Apr 2020 03:18)  Source: .Blood Blood-Peripheral  Final Report (10 Apr 2020 09:01):    No Growth Final    Culture - Blood (collected 06 Apr 2020 03:18)  Source: .Blood Blood-Peripheral  Final Report (10 Apr 2020 09:01):    No Growth Final    < from: Xray Chest 1 View AP/PA (04.06.20 @ 03:42) >  MPRESSION:  Scattered opacities both lungs along with interstitial lung disease. Patient is a 69y old  Male who presents with a chief complaint of fever, cough, hypoxia, COVID +    Interval Hx  pt with low grade temperature this am, tachycardic to 120, satting 85 % NRB  Patient seen and examined at bedside.    ALLERGIES:  No Known Allergies    MEDICATIONS  (STANDING):  enoxaparin Injectable 40 milliGRAM(s) SubCutaneous every 12 hours  insulin glargine Injectable (LANTUS) 18 Unit(s) SubCutaneous at bedtime  methylPREDNISolone sodium succinate Injectable 35 milliGRAM(s) IV Push every 12 hours  multivitamin 1 Tablet(s) Oral daily  pantoprazole    Tablet 40 milliGRAM(s) Oral before breakfast  thiamine 200 milliGRAM(s) Oral daily  zinc sulfate 220 milliGRAM(s) Oral daily    MEDICATIONS  (PRN):  acetaminophen   Tablet .. 650 milliGRAM(s) Oral every 4 hours PRN Temp greater or equal to 38.5C (101.3F)  acetaminophen  Suppository .. 650 milliGRAM(s) Rectal every 4 hours PRN Temp greater or equal to 38.5C (101.3F)  ALBUTerol    90 MICROgram(s) HFA Inhaler 2 Puff(s) Inhalation every 4 hours PRN Shortness of Breath and/or Wheezing  dextrose 40% Gel 15 Gram(s) Oral once PRN Blood Glucose LESS THAN 70 milliGRAM(s)/deciliter  glucagon  Injectable 1 milliGRAM(s) IntraMuscular once PRN Glucose LESS THAN 70 milligrams/deciliter  guaifenesin/dextromethorphan  Syrup 10 milliLiter(s) Oral every 4 hours PRN Cough  melatonin 3 milliGRAM(s) Oral at bedtime PRN Insomnia    Vital Signs Last 24 Hrs  T(F): 100.4 (11 Apr 2020 05:42), Max: 100.4 (11 Apr 2020 05:42)  HR: 120 (11 Apr 2020 05:42) (120 - 120)  BP: 111/68 (11 Apr 2020 05:42) (111/68 - 111/68)  RR: 21 (11 Apr 2020 05:42) (21 - 21)  SpO2: 86% on NRB (11 Apr 2020 05:42) (86% - 86%)  I&O's Summary    PHYSICAL EXAM:  General: NAD, A/O x 3  ENT: MMM  Neck: Supple, No JVD  Lungs: B/L scattered crackles, poor inspiratory effort  Cardio: RRR, S1/S2, No murmurs  Abdomen: Soft, Nontender, Nondistended; Bowel sounds present  Extremities: No calf tenderness, No pitting edema    LABS:                        14.6   19.62 )-----------( 327      ( 11 Apr 2020 06:30 )             44.5     04-11    134  |  98  |  21  ----------------------------<  218  4.2   |  26  |  0.69    Ca    9.0      11 Apr 2020 06:30    TPro  7.4  /  Alb  3.1  /  TBili  0.8  /  DBili  x   /  AST  46  /  ALT  77  /  AlkPhos  91  04-09    eGFR if Non African American: 98 mL/min/1.73M2 (04-11-20 @ 06:30)  eGFR if : 113 mL/min/1.73M2 (04-11-20 @ 06:30)    04-07 [id number] 8.8    Culture - Blood (collected 06 Apr 2020 03:18)  Source: .Blood Blood-Peripheral  Final Report (10 Apr 2020 09:01):    No Growth Final    Culture - Blood (collected 06 Apr 2020 03:18)  Source: .Blood Blood-Peripheral  Final Report (10 Apr 2020 09:01):    No Growth Final    < from: Xray Chest 1 View AP/PA (04.06.20 @ 03:42) >  MPRESSION:  Scattered opacities both lungs along with interstitial lung disease.

## 2020-04-11 NOTE — DIETITIAN INITIAL EVALUATION ADULT. - NUTRITIONGOAL OUTCOME1
Pt will teach back 3 points from the education provided. Pt will teach back 3 points from the education provided by the follow up interview.

## 2020-04-12 LAB
ALBUMIN SERPL ELPH-MCNC: 2.6 G/DL — LOW (ref 3.3–5)
ALP SERPL-CCNC: 84 U/L — SIGNIFICANT CHANGE UP (ref 40–120)
ALT FLD-CCNC: 64 U/L — HIGH (ref 10–45)
ANION GAP SERPL CALC-SCNC: 13 MMOL/L — SIGNIFICANT CHANGE UP (ref 5–17)
AST SERPL-CCNC: 31 U/L — SIGNIFICANT CHANGE UP (ref 10–40)
BILIRUB DIRECT SERPL-MCNC: 0.2 MG/DL — SIGNIFICANT CHANGE UP (ref 0–0.2)
BILIRUB INDIRECT FLD-MCNC: 0.4 MG/DL — SIGNIFICANT CHANGE UP (ref 0.2–1)
BILIRUB SERPL-MCNC: 0.6 MG/DL — SIGNIFICANT CHANGE UP (ref 0.2–1.2)
BUN SERPL-MCNC: 38 MG/DL — HIGH (ref 7–23)
CALCIUM SERPL-MCNC: 9.1 MG/DL — SIGNIFICANT CHANGE UP (ref 8.4–10.5)
CHLORIDE SERPL-SCNC: 98 MMOL/L — SIGNIFICANT CHANGE UP (ref 96–108)
CO2 BLDA-SCNC: 25 MMOL/L — SIGNIFICANT CHANGE UP (ref 22–30)
CO2 SERPL-SCNC: 23 MMOL/L — SIGNIFICANT CHANGE UP (ref 22–31)
CREAT SERPL-MCNC: 0.88 MG/DL — SIGNIFICANT CHANGE UP (ref 0.5–1.3)
CRP SERPL-MCNC: 11.6 MG/DL — HIGH (ref 0–0.4)
CRP SERPL-MCNC: 16.41 MG/DL — HIGH (ref 0–0.4)
FERRITIN SERPL-MCNC: 910 NG/ML — HIGH (ref 30–400)
FERRITIN SERPL-MCNC: 941 NG/ML — HIGH (ref 30–400)
GAS PNL BLDA: SIGNIFICANT CHANGE UP
GLUCOSE BLDC GLUCOMTR-MCNC: 241 MG/DL — HIGH (ref 70–99)
GLUCOSE BLDC GLUCOMTR-MCNC: 287 MG/DL — HIGH (ref 70–99)
GLUCOSE BLDC GLUCOMTR-MCNC: 355 MG/DL — HIGH (ref 70–99)
GLUCOSE BLDC GLUCOMTR-MCNC: 384 MG/DL — HIGH (ref 70–99)
GLUCOSE SERPL-MCNC: 424 MG/DL — HIGH (ref 70–99)
HCT VFR BLD CALC: 43.8 % — SIGNIFICANT CHANGE UP (ref 39–50)
HGB BLD-MCNC: 14.4 G/DL — SIGNIFICANT CHANGE UP (ref 13–17)
HOROWITZ INDEX BLDA+IHG-RTO: SIGNIFICANT CHANGE UP
MCHC RBC-ENTMCNC: 29.3 PG — SIGNIFICANT CHANGE UP (ref 27–34)
MCHC RBC-ENTMCNC: 32.9 GM/DL — SIGNIFICANT CHANGE UP (ref 32–36)
MCV RBC AUTO: 89.2 FL — SIGNIFICANT CHANGE UP (ref 80–100)
NRBC # BLD: 0 /100 WBCS — SIGNIFICANT CHANGE UP (ref 0–0)
PCO2 BLDA: 47 MMHG — HIGH (ref 32–46)
PH BLDA: 7.32 — LOW (ref 7.35–7.45)
PLATELET # BLD AUTO: 457 K/UL — HIGH (ref 150–400)
PO2 BLDA: 98 MMHG — SIGNIFICANT CHANGE UP (ref 74–108)
POTASSIUM SERPL-MCNC: 4.8 MMOL/L — SIGNIFICANT CHANGE UP (ref 3.5–5.3)
POTASSIUM SERPL-SCNC: 4.8 MMOL/L — SIGNIFICANT CHANGE UP (ref 3.5–5.3)
PROCALCITONIN SERPL-MCNC: 0.26 NG/ML — HIGH
PROT SERPL-MCNC: 7.1 G/DL — SIGNIFICANT CHANGE UP (ref 6–8.3)
RBC # BLD: 4.91 M/UL — SIGNIFICANT CHANGE UP (ref 4.2–5.8)
RBC # FLD: 13.4 % — SIGNIFICANT CHANGE UP (ref 10.3–14.5)
SAO2 % BLDA: 96 % — SIGNIFICANT CHANGE UP (ref 92–96)
SODIUM SERPL-SCNC: 134 MMOL/L — LOW (ref 135–145)
TROPONIN I SERPL-MCNC: <.017 NG/ML — LOW (ref 0.02–0.06)
WBC # BLD: 40.66 K/UL — CRITICAL HIGH (ref 3.8–10.5)
WBC # FLD AUTO: 40.66 K/UL — CRITICAL HIGH (ref 3.8–10.5)

## 2020-04-12 PROCEDURE — 71045 X-RAY EXAM CHEST 1 VIEW: CPT | Mod: 26,76

## 2020-04-12 RX ORDER — INSULIN LISPRO 100/ML
VIAL (ML) SUBCUTANEOUS EVERY 6 HOURS
Refills: 0 | Status: DISCONTINUED | OUTPATIENT
Start: 2020-04-12 | End: 2020-04-14

## 2020-04-12 RX ORDER — INSULIN GLARGINE 100 [IU]/ML
5 INJECTION, SOLUTION SUBCUTANEOUS ONCE
Refills: 0 | Status: COMPLETED | OUTPATIENT
Start: 2020-04-12 | End: 2020-04-12

## 2020-04-12 RX ORDER — TOCILIZUMAB 20 MG/ML
400 INJECTION, SOLUTION, CONCENTRATE INTRAVENOUS ONCE
Refills: 0 | Status: COMPLETED | OUTPATIENT
Start: 2020-04-12 | End: 2020-04-12

## 2020-04-12 RX ORDER — INSULIN GLARGINE 100 [IU]/ML
23 INJECTION, SOLUTION SUBCUTANEOUS AT BEDTIME
Refills: 0 | Status: DISCONTINUED | OUTPATIENT
Start: 2020-04-12 | End: 2020-04-13

## 2020-04-12 RX ORDER — CHLORHEXIDINE GLUCONATE 213 G/1000ML
1 SOLUTION TOPICAL
Refills: 0 | Status: DISCONTINUED | OUTPATIENT
Start: 2020-04-12 | End: 2020-04-17

## 2020-04-12 RX ORDER — MIDAZOLAM HYDROCHLORIDE 1 MG/ML
2 INJECTION, SOLUTION INTRAMUSCULAR; INTRAVENOUS ONCE
Refills: 0 | Status: DISCONTINUED | OUTPATIENT
Start: 2020-04-12 | End: 2020-04-12

## 2020-04-12 RX ORDER — SODIUM CHLORIDE 9 MG/ML
1000 INJECTION, SOLUTION INTRAVENOUS
Refills: 0 | Status: DISCONTINUED | OUTPATIENT
Start: 2020-04-12 | End: 2020-04-13

## 2020-04-12 RX ORDER — HYDROMORPHONE HYDROCHLORIDE 2 MG/ML
1 INJECTION INTRAMUSCULAR; INTRAVENOUS; SUBCUTANEOUS
Refills: 0 | Status: DISCONTINUED | OUTPATIENT
Start: 2020-04-12 | End: 2020-04-12

## 2020-04-12 RX ORDER — PROPOFOL 10 MG/ML
10 INJECTION, EMULSION INTRAVENOUS
Qty: 1000 | Refills: 0 | Status: DISCONTINUED | OUTPATIENT
Start: 2020-04-12 | End: 2020-04-14

## 2020-04-12 RX ORDER — CHLORHEXIDINE GLUCONATE 213 G/1000ML
15 SOLUTION TOPICAL EVERY 12 HOURS
Refills: 0 | Status: DISCONTINUED | OUTPATIENT
Start: 2020-04-12 | End: 2020-04-14

## 2020-04-12 RX ORDER — MIDAZOLAM HYDROCHLORIDE 1 MG/ML
2 INJECTION, SOLUTION INTRAMUSCULAR; INTRAVENOUS
Refills: 0 | Status: DISCONTINUED | OUTPATIENT
Start: 2020-04-12 | End: 2020-04-13

## 2020-04-12 RX ORDER — SENNA PLUS 8.6 MG/1
2 TABLET ORAL AT BEDTIME
Refills: 0 | Status: DISCONTINUED | OUTPATIENT
Start: 2020-04-12 | End: 2020-04-17

## 2020-04-12 RX ORDER — SODIUM CHLORIDE 9 MG/ML
10 INJECTION INTRAMUSCULAR; INTRAVENOUS; SUBCUTANEOUS
Refills: 0 | Status: DISCONTINUED | OUTPATIENT
Start: 2020-04-12 | End: 2020-04-17

## 2020-04-12 RX ORDER — PANTOPRAZOLE SODIUM 20 MG/1
40 TABLET, DELAYED RELEASE ORAL DAILY
Refills: 0 | Status: DISCONTINUED | OUTPATIENT
Start: 2020-04-12 | End: 2020-04-15

## 2020-04-12 RX ORDER — MIDAZOLAM HYDROCHLORIDE 1 MG/ML
0.07 INJECTION, SOLUTION INTRAMUSCULAR; INTRAVENOUS
Qty: 50 | Refills: 0 | Status: DISCONTINUED | OUTPATIENT
Start: 2020-04-12 | End: 2020-04-13

## 2020-04-12 RX ORDER — MIDODRINE HYDROCHLORIDE 2.5 MG/1
10 TABLET ORAL EVERY 8 HOURS
Refills: 0 | Status: DISCONTINUED | OUTPATIENT
Start: 2020-04-12 | End: 2020-04-14

## 2020-04-12 RX ORDER — NOREPINEPHRINE BITARTRATE/D5W 8 MG/250ML
0.05 PLASTIC BAG, INJECTION (ML) INTRAVENOUS
Qty: 16 | Refills: 0 | Status: DISCONTINUED | OUTPATIENT
Start: 2020-04-12 | End: 2020-04-13

## 2020-04-12 RX ADMIN — CHLORHEXIDINE GLUCONATE 15 MILLILITER(S): 213 SOLUTION TOPICAL at 07:02

## 2020-04-12 RX ADMIN — ZINC SULFATE TAB 220 MG (50 MG ZINC EQUIVALENT) 220 MILLIGRAM(S): 220 (50 ZN) TAB at 11:34

## 2020-04-12 RX ADMIN — MIDAZOLAM HYDROCHLORIDE 5.08 MG/KG/HR: 1 INJECTION, SOLUTION INTRAMUSCULAR; INTRAVENOUS at 13:00

## 2020-04-12 RX ADMIN — Medication 40 MILLIGRAM(S): at 17:23

## 2020-04-12 RX ADMIN — Medication 2: at 00:47

## 2020-04-12 RX ADMIN — PROPOFOL 4.36 MICROGRAM(S)/KG/MIN: 10 INJECTION, EMULSION INTRAVENOUS at 17:00

## 2020-04-12 RX ADMIN — MIDAZOLAM HYDROCHLORIDE 2 MILLIGRAM(S): 1 INJECTION, SOLUTION INTRAMUSCULAR; INTRAVENOUS at 00:49

## 2020-04-12 RX ADMIN — CHLORHEXIDINE GLUCONATE 15 MILLILITER(S): 213 SOLUTION TOPICAL at 17:23

## 2020-04-12 RX ADMIN — Medication 650 MILLIGRAM(S): at 00:48

## 2020-04-12 RX ADMIN — Medication 80 MILLIGRAM(S): at 00:30

## 2020-04-12 RX ADMIN — Medication 10: at 07:27

## 2020-04-12 RX ADMIN — Medication 4: at 17:35

## 2020-04-12 RX ADMIN — TOCILIZUMAB 120 MILLIGRAM(S): 20 INJECTION, SOLUTION, CONCENTRATE INTRAVENOUS at 12:12

## 2020-04-12 RX ADMIN — HYDROMORPHONE HYDROCHLORIDE 1 MILLIGRAM(S): 2 INJECTION INTRAMUSCULAR; INTRAVENOUS; SUBCUTANEOUS at 01:30

## 2020-04-12 RX ADMIN — SENNA PLUS 2 TABLET(S): 8.6 TABLET ORAL at 21:06

## 2020-04-12 RX ADMIN — Medication 200 MILLIGRAM(S): at 11:34

## 2020-04-12 RX ADMIN — PANTOPRAZOLE SODIUM 40 MILLIGRAM(S): 20 TABLET, DELAYED RELEASE ORAL at 11:34

## 2020-04-12 RX ADMIN — MIDAZOLAM HYDROCHLORIDE 5.08 MG/KG/HR: 1 INJECTION, SOLUTION INTRAMUSCULAR; INTRAVENOUS at 21:00

## 2020-04-12 RX ADMIN — Medication 3.4 MICROGRAM(S)/KG/MIN: at 07:00

## 2020-04-12 RX ADMIN — INSULIN GLARGINE 5 UNIT(S): 100 INJECTION, SOLUTION SUBCUTANEOUS at 01:09

## 2020-04-12 RX ADMIN — Medication 10: at 12:16

## 2020-04-12 RX ADMIN — ENOXAPARIN SODIUM 40 MILLIGRAM(S): 100 INJECTION SUBCUTANEOUS at 07:02

## 2020-04-12 RX ADMIN — PROPOFOL 4.36 MICROGRAM(S)/KG/MIN: 10 INJECTION, EMULSION INTRAVENOUS at 12:22

## 2020-04-12 RX ADMIN — PROPOFOL 4.36 MICROGRAM(S)/KG/MIN: 10 INJECTION, EMULSION INTRAVENOUS at 07:01

## 2020-04-12 RX ADMIN — CHLORHEXIDINE GLUCONATE 1 APPLICATION(S): 213 SOLUTION TOPICAL at 09:19

## 2020-04-12 RX ADMIN — ENOXAPARIN SODIUM 40 MILLIGRAM(S): 100 INJECTION SUBCUTANEOUS at 17:23

## 2020-04-12 RX ADMIN — Medication 40 MILLIGRAM(S): at 00:49

## 2020-04-12 RX ADMIN — Medication 3.4 MICROGRAM(S)/KG/MIN: at 03:13

## 2020-04-12 RX ADMIN — SODIUM CHLORIDE 75 MILLILITER(S): 9 INJECTION, SOLUTION INTRAVENOUS at 13:00

## 2020-04-12 RX ADMIN — INSULIN GLARGINE 23 UNIT(S): 100 INJECTION, SOLUTION SUBCUTANEOUS at 21:05

## 2020-04-12 RX ADMIN — MIDAZOLAM HYDROCHLORIDE 5.08 MG/KG/HR: 1 INJECTION, SOLUTION INTRAMUSCULAR; INTRAVENOUS at 07:02

## 2020-04-12 RX ADMIN — Medication 1 TABLET(S): at 11:33

## 2020-04-12 NOTE — PROGRESS NOTE ADULT - ASSESSMENT
Physical Examination:  GENERAL:               Sedated and intubated  HEENT:                    Pupils equal, reactive to light.  Symmetric. No JVD, Moist MM  PULM:                     Bilateral air entry, coarse breath sounds  CVS:                         S1, S2,  No Murmur  ABD:                        Soft, nondistended, nontender, normoactive bowel sounds,   EXT:                         No edema, nontender, No Cyanosis or Clubbing   Vascular:                Warm Extremities, Normal Capillary refill, Normal Distal Pulses  SKIN:                       Warm and well perfused, no rashes noted.   NEURO:                  Sedated, withdraws to noxious stimuli  PSYC:                      No insight    Assessment:  1. Acute respiratory failure  2. Severe sepsis  3. Viral pneumonia secondary to COVID 19  4. CLL  5. Diabetes Mellitus    Plan  - Mechanical ventilation with lung protective strategy   - Titrate Fio2 and PEEP as tolerated  - Sedation for ventilator synchrony   - Vasopressor support to maintain MAP > 60  - Monitor ABG  - Completed Hydoxychloroquine  - IV steroids and Tocilizumab today  - Basal/bolus insulin regimen with sliding scale for hyperglycemia coverage  - Airborne and Contact isolation  - Monitor urine output  - Keep close to euvolemic volume status   - Gentle hydration  - DVT and Stress Ulcer prophylaxis   - Son Kwasi [phone] called 4/12

## 2020-04-12 NOTE — PROVIDER CONTACT NOTE (EICU) - RECOMMENDATIONS
1. Empiric start with steroids, prednisone 1mg/kg.  2. Vanco, Zosyn  3. High PEEP, low FiO2 strategy for ARDS -- titrate PEEP up to plateau pressure < 30mmHg and titrate FiO2 down to a goal of o2sat 92%  4. Trend ferritin, LDH, D-dimer and CRP

## 2020-04-12 NOTE — PROVIDER CONTACT NOTE (EICU) - ASSESSMENT
Right main stem intubation (spoke with ICU PA, being pulled up 3-4cm), CXR showed bilateral infiltrate  Low grade fever  D-dimer is trending down, but CRP, and LDH were trending up, no recent ferritin level    Problem List  1. Acute hypoxic respiratory failure, treated with plaquenil, now developed low grade fever, worsening hypoxia.  Ddx: second wave of COVID, bacterial coinfection, PE

## 2020-04-12 NOTE — PROGRESS NOTE ADULT - SUBJECTIVE AND OBJECTIVE BOX
69y year old Male admitted for Patient is a 69y old  Male who presents with a chief complaint of fever, cough, hypoxia, COVID + (11 Apr 2020 13:49)          Brief Hospital Course: 1 HPI element     ROS: 1 system      PMH:  Acute respiratory failure with hypoxia  Handoff  MEWS Score  CLL (chronic lymphocytic leukemia)  DM (diabetes mellitus)  Acute respiratory failure with hypoxia  No significant past surgical history  SOB  Viral pneumonia        MEDICATIONS  (STANDING):  chlorhexidine 0.12% Liquid 15 milliLiter(s) Oral Mucosa every 12 hours  dextrose 5%. 1000 milliLiter(s) (50 mL/Hr) IV Continuous <Continuous>  dextrose 50% Injectable 12.5 Gram(s) IV Push once  dextrose 50% Injectable 25 Gram(s) IV Push once  dextrose 50% Injectable 25 Gram(s) IV Push once  enoxaparin Injectable 40 milliGRAM(s) SubCutaneous every 12 hours  insulin glargine Injectable (LANTUS) 23 Unit(s) SubCutaneous at bedtime  insulin glargine Injectable (LANTUS) 5 Unit(s) SubCutaneous once  insulin lispro (HumaLOG) corrective regimen sliding scale   SubCutaneous three times a day before meals  insulin lispro (HumaLOG) corrective regimen sliding scale   SubCutaneous at bedtime  insulin lispro Injectable (HumaLOG) 6 Unit(s) SubCutaneous three times a day before meals  methylPREDNISolone sodium succinate Injectable 40 milliGRAM(s) IV Push every 12 hours  midazolam Infusion 0.07 mG/kG/Hr (5.08 mL/Hr) IV Continuous <Continuous>  multivitamin 1 Tablet(s) Oral daily  pantoprazole    Tablet 40 milliGRAM(s) Oral before breakfast  rocuronium Injectable 80 milliGRAM(s) IV Push once  thiamine 200 milliGRAM(s) Oral daily  zinc sulfate 220 milliGRAM(s) Oral daily    MEDICATIONS  (PRN):  acetaminophen   Tablet .. 650 milliGRAM(s) Oral every 4 hours PRN Temp greater or equal to 38.5C (101.3F)  acetaminophen  Suppository .. 650 milliGRAM(s) Rectal every 4 hours PRN Temp greater or equal to 38.5C (101.3F)  ALBUTerol    90 MICROgram(s) HFA Inhaler 2 Puff(s) Inhalation every 4 hours PRN Shortness of Breath and/or Wheezing  dextrose 40% Gel 15 Gram(s) Oral once PRN Blood Glucose LESS THAN 70 milliGRAM(s)/deciliter  glucagon  Injectable 1 milliGRAM(s) IntraMuscular once PRN Glucose LESS THAN 70 milligrams/deciliter  guaifenesin/dextromethorphan  Syrup 10 milliLiter(s) Oral every 4 hours PRN Cough  melatonin 3 milliGRAM(s) Oral at bedtime PRN Insomnia      I&O's Summary    11 Apr 2020 07:01  -  12 Apr 2020 01:09  --------------------------------------------------------  IN: 0 mL / OUT: 300 mL / NET: -300 mL      ICU Vital Signs Last 24 Hrs  T(C): 35.6 (11 Apr 2020 15:30), Max: 38 (11 Apr 2020 05:42)  T(F): 96 (11 Apr 2020 15:30), Max: 100.4 (11 Apr 2020 05:42)  HR: 100 (11 Apr 2020 18:27) (100 - 121)  BP: 109/63 (11 Apr 2020 15:30) (109/63 - 111/68)  BP(mean): --  ABP: --  ABP(mean): --  RR: 21 (11 Apr 2020 05:42) (21 - 21)  SpO2: 89% (11 Apr 2020 18:27) (82% - 89%)      PHYSICAL EXAM:  General: NAD conversant  CV: s1s2 RRR no murmurs  pulm: Clear bilaterally   GI: soft NTND   Extremities: no periferal edema      04-11    134<L>  |  98  |  21  ----------------------------<  218<H>  4.2   |  26  |  0.69    Ca    9.0      11 Apr 2020 06:30                            14.6   19.62 )-----------( 327      ( 11 Apr 2020 06:30 )             44.5                    Assessment:  1.  2.  3.    Plan:    25 min spent face to face with patient 69y year old Male admitted for Patient is a 69y old  Male who presents with a chief complaint of fever, cough, hypoxia, COVID + (11 Apr 2020 13:49)        70 yo M with type 2 DM, CLL with COVID 19 pneumonia hypoxic to high 70s low 80s and in respiratory distress on the floor complaining of dyspnea. Transferred to ICu and intubated.        PMH:  Acute respiratory failure with hypoxia  Handoff  MEWS Score  CLL (chronic lymphocytic leukemia)  DM (diabetes mellitus)  Acute respiratory failure with hypoxia  No significant past surgical history  SOB  Viral pneumonia        MEDICATIONS  (STANDING):  chlorhexidine 0.12% Liquid 15 milliLiter(s) Oral Mucosa every 12 hours  dextrose 5%. 1000 milliLiter(s) (50 mL/Hr) IV Continuous <Continuous>  dextrose 50% Injectable 12.5 Gram(s) IV Push once  dextrose 50% Injectable 25 Gram(s) IV Push once  dextrose 50% Injectable 25 Gram(s) IV Push once  enoxaparin Injectable 40 milliGRAM(s) SubCutaneous every 12 hours  insulin glargine Injectable (LANTUS) 23 Unit(s) SubCutaneous at bedtime  insulin glargine Injectable (LANTUS) 5 Unit(s) SubCutaneous once  insulin lispro (HumaLOG) corrective regimen sliding scale   SubCutaneous three times a day before meals  insulin lispro (HumaLOG) corrective regimen sliding scale   SubCutaneous at bedtime  insulin lispro Injectable (HumaLOG) 6 Unit(s) SubCutaneous three times a day before meals  methylPREDNISolone sodium succinate Injectable 40 milliGRAM(s) IV Push every 12 hours  midazolam Infusion 0.07 mG/kG/Hr (5.08 mL/Hr) IV Continuous <Continuous>  multivitamin 1 Tablet(s) Oral daily  pantoprazole    Tablet 40 milliGRAM(s) Oral before breakfast  rocuronium Injectable 80 milliGRAM(s) IV Push once  thiamine 200 milliGRAM(s) Oral daily  zinc sulfate 220 milliGRAM(s) Oral daily    MEDICATIONS  (PRN):  acetaminophen   Tablet .. 650 milliGRAM(s) Oral every 4 hours PRN Temp greater or equal to 38.5C (101.3F)  acetaminophen  Suppository .. 650 milliGRAM(s) Rectal every 4 hours PRN Temp greater or equal to 38.5C (101.3F)  ALBUTerol    90 MICROgram(s) HFA Inhaler 2 Puff(s) Inhalation every 4 hours PRN Shortness of Breath and/or Wheezing  dextrose 40% Gel 15 Gram(s) Oral once PRN Blood Glucose LESS THAN 70 milliGRAM(s)/deciliter  glucagon  Injectable 1 milliGRAM(s) IntraMuscular once PRN Glucose LESS THAN 70 milligrams/deciliter  guaifenesin/dextromethorphan  Syrup 10 milliLiter(s) Oral every 4 hours PRN Cough  melatonin 3 milliGRAM(s) Oral at bedtime PRN Insomnia      I&O's Summary    11 Apr 2020 07:01  -  12 Apr 2020 01:09  --------------------------------------------------------  IN: 0 mL / OUT: 300 mL / NET: -300 mL      ICU Vital Signs Last 24 Hrs  T(C): 35.6 (11 Apr 2020 15:30), Max: 38 (11 Apr 2020 05:42)  T(F): 96 (11 Apr 2020 15:30), Max: 100.4 (11 Apr 2020 05:42)  HR: 100 (11 Apr 2020 18:27) (100 - 121)  BP: 109/63 (11 Apr 2020 15:30) (109/63 - 111/68)  BP(mean): --  ABP: --  ABP(mean): --  RR: 21 (11 Apr 2020 05:42) (21 - 21)  SpO2: 89% (11 Apr 2020 18:27) (82% - 89%)      PHYSICAL EXAM:  awake alert tachypneic to 50s    04-11    134<L>  |  98  |  21  ----------------------------<  218<H>  4.2   |  26  |  0.69    Ca    9.0      11 Apr 2020 06:30                            14.6   19.62 )-----------( 327      ( 11 Apr 2020 06:30 )             44.5       70 yo M with type 2 DM, CLL with COVID 19 pneumonia hypoxic to high 70s low 80s and in respiratory distress on the floor complaining of dyspnea. Transferred to ICU and intubated.  - Neuro: propofol for sedation, still awake on max dose, add versed, titrate for rass -3 ot -4  - Cardio- sinus tachycardia, increase sedation and monitor. BP stable. Check LFTs in AM, start lovastatin if coming down.   - Pulm: Hypoxic respiratory failure from COVID 19 pneuomonia,  lung protective vent strategy, IBW 63, use .  titrate fi02 and PEEP to sp02 >92%. no need to prone or paralyze at this time. follow up ABG. restart steroids methylprednisolone 40 q12  - ID; COVID 19 pneumonia s/p plaquanil/steroids, Anaikinra.  restart steroids for worsening hypoxia.  continue to follow daily COVID 19 inflammatory markers.  No evidence of bacterial infection at this time.   - GI: OG tube placed.  NPO except meds for now. protonix for GI ppx  - Renal: Cr stable. hyponatremia, hold D5w, trend.  Monitor I/Os, daily chemistry  - Heme- Lovenox for DVT ppx  - Endo: insulin therapy for goal -180.  Restarted steroids, FS already high, increase lantus from 18 to 23.   Dispo- Critically ill with hypoxic respiratory failure from COVID 19 pneumonia, admit to ICU for further care.  I spoke to Son emanuel and updated him on his fathers condition.     discussed with Dr David HANNA    CC time 60 min including time spent reviewing chart, ordering tests and treatments, discussing care with family and patient, coordinating care with MDT, not including procedures.

## 2020-04-12 NOTE — CHART NOTE - NSCHARTNOTEFT_GEN_A_CORE
Nutrition Follow Up Note  Hospital Course (Per Electronic Medical Record): Pt w/ hx of DM, CLL admitted 4-6-20; seen by dietitian yesterday when pt was on an oral diet. Pt now w/ acute respiratory failure due to COVID-19. Pt intubated, sedated (on propofol), on mechanical ventilation. Noted w/ elevated glucose, pt has DM, is on steroids (solu-medrol) likely affecting BG, in addition to inflammatory response (elevated CRP, ferritin noted)  Source: Medical Record [X] Patient [ ] Family [ ]         Diet: NPO w/ TF    Enteral/Parenteral Nutrition: Current order is for Glucerna 1.5 initiated @ 10 ml/hr increase by 10 ml every 6 hours to reach goal of 20 ml/hr x 24 hours (480 ml total volume, provides 720 kcal, 40 g protein). Propofol (provides 1.1 kcal/ml) @ current rate of 21.28 ml/hr will provide 561 kcal/day if it remains at current rate.       Current Weight: 139.3 lbs (4-12-20)  160 lbs (4-11-20)?  Recommend re-weighing pt/obtaining daily weight to accurately assess weight status    Pertinent Medications: MEDICATIONS  (STANDING):  chlorhexidine 0.12% Liquid 15 milliLiter(s) Oral Mucosa every 12 hours  chlorhexidine 4% Liquid 1 Application(s) Topical <User Schedule>  dextrose 50% Injectable 12.5 Gram(s) IV Push once  dextrose 50% Injectable 25 Gram(s) IV Push once  dextrose 50% Injectable 25 Gram(s) IV Push once  enoxaparin Injectable 40 milliGRAM(s) SubCutaneous every 12 hours  insulin glargine Injectable (LANTUS) 23 Unit(s) SubCutaneous at bedtime  insulin lispro (HumaLOG) corrective regimen sliding scale   SubCutaneous every 6 hours  lactated ringers. 1000 milliLiter(s) (75 mL/Hr) IV Continuous <Continuous>  methylPREDNISolone sodium succinate Injectable 40 milliGRAM(s) IV Push every 12 hours  midazolam Infusion 0.07 mG/kG/Hr (5.08 mL/Hr) IV Continuous <Continuous>  multivitamin 1 Tablet(s) Oral daily  norepinephrine Infusion 0.05 MICROgram(s)/kG/Min (3.4 mL/Hr) IV Continuous <Continuous>  pantoprazole  Injectable 40 milliGRAM(s) IV Push daily  propofol Infusion 10 MICROgram(s)/kG/Min (4.36 mL/Hr) IV Continuous <Continuous>  senna 2 Tablet(s) Oral at bedtime  thiamine 200 milliGRAM(s) Oral daily  zinc sulfate 220 milliGRAM(s) Oral daily    MEDICATIONS  (PRN):  acetaminophen  Suppository .. 650 milliGRAM(s) Rectal every 4 hours PRN Temp greater or equal to 38.5C (101.3F)  ALBUTerol    90 MICROgram(s) HFA Inhaler 2 Puff(s) Inhalation every 4 hours PRN Shortness of Breath and/or Wheezing  guaifenesin/dextromethorphan  Syrup 10 milliLiter(s) Oral every 4 hours PRN Cough  melatonin 3 milliGRAM(s) Oral at bedtime PRN Insomnia  midazolam Injectable 2 milliGRAM(s) IV Push every 2 hours PRN agitation  sodium chloride 0.9% lock flush 10 milliLiter(s) IV Push every 1 hour PRN Pre/post blood products, medications, blood draw, and to maintain line patency      Pertinent Labs:  04-12 Na134 mmol/L<L> Glu 424 mg/dL<H> K+ 4.8 mmol/L Cr  0.88 mg/dL BUN 38 mg/dL<H> 04-12 Alb 2.6 g/dL<L> 04-07 [id number] 8.8 %<H>        Skin: Intact of PU per flow sheets    Edema: none noted per flow sheets    Last BM: on 4-11-20 per dietitian moody yesterday    Estimated Needs:   [ ] No Change since Previous Assessment  [X] Recalculated: based on IBW of 142 lbs (64.5 kg)  25-30 kcal/kg= 4081-9045 kcal/day  1.0-1.5 g protein/kg = 65-98 g protein/day    Previous Nutrition Diagnosis:   Food & Nutrition Related Knowledge Deficit.       Nutrition Diagnosis is [X] Ongoing    [ ] Resolved   [ ] Not Applicable      New Nutrition Diagnosis: [ ] Not Applicable  [ ] Inadequate Protein Energy Intake   [ ] Inadequate Oral Intake   [ ] Excessive Energy Intake   [ ] Increased Nutrient Needs   [ ] Obesity   [ ] Altered GI Function   [ ] Unintended Weight Loss   [ ] Food & Nutrition Related Knowledge Deficit  [ ] Limited Adherence to nutrition related recommendations   [ ] Malnutrition    [X] Altered nutrition related lab values- elevated glucose, inflammatory markers due to acute respiratory failure and sepsis requiring mechanical ventilation    Interventions:   1. Recommend changing TF to Pivot 1.5 @ 20 ml/hr increase by 10 ml q 4 hours to reach goal of 30 ml/hr x 24 hours (will provide 1080 kcal, 68 g protein, with NoCarb ProSource TF QD (provides additional 60 kcal, 15 g protein) to meet protein needs. Propofol @ current rate provides ~500 kcal from lipids which will meet lower range of calorie requirements.  2. If pt remains intubated and propofol requirements decrease, recommend increasing TF to 40 ml/hr x 24 hours (will provide 1440 kcal, 90 g protein), continue w/ protein supplement.   3. Daily weights  4. Continue w/ MVI, zinc, add vitamin C when available, thiamine  5. Monitor glucose, electrolytes    Monitoring & Evaluation:   [X] Weights   [ ] PO Intake   [X] Follow Up (Per Protocol)  [X] Tolerance to Enteral nutrition rx   [X] Other: Labs & PCOT    RD Remains Available.  Tina Rico RD

## 2020-04-12 NOTE — PROGRESS NOTE ADULT - SUBJECTIVE AND OBJECTIVE BOX
Follow-up Critical Care Progress Note  Chief Complaint : Acute respiratory failure with hypoxia    Worsening hypoxia, intubated overnight.    Allergies :No Known Allergies      PAST MEDICAL & SURGICAL HISTORY:  CLL (chronic lymphocytic leukemia)  DM (diabetes mellitus)  No significant past surgical history      Medications:  MEDICATIONS  (STANDING):  chlorhexidine 0.12% Liquid 15 milliLiter(s) Oral Mucosa every 12 hours  chlorhexidine 4% Liquid 1 Application(s) Topical <User Schedule>  dextrose 50% Injectable 12.5 Gram(s) IV Push once  dextrose 50% Injectable 25 Gram(s) IV Push once  dextrose 50% Injectable 25 Gram(s) IV Push once  enoxaparin Injectable 40 milliGRAM(s) SubCutaneous every 12 hours  insulin glargine Injectable (LANTUS) 23 Unit(s) SubCutaneous at bedtime  insulin lispro (HumaLOG) corrective regimen sliding scale   SubCutaneous every 6 hours  lactated ringers. 1000 milliLiter(s) (75 mL/Hr) IV Continuous <Continuous>  methylPREDNISolone sodium succinate Injectable 40 milliGRAM(s) IV Push every 12 hours  midazolam Infusion 0.07 mG/kG/Hr (5.08 mL/Hr) IV Continuous <Continuous>  multivitamin 1 Tablet(s) Oral daily  norepinephrine Infusion 0.05 MICROgram(s)/kG/Min (3.4 mL/Hr) IV Continuous <Continuous>  pantoprazole  Injectable 40 milliGRAM(s) IV Push daily  propofol Infusion 10 MICROgram(s)/kG/Min (4.36 mL/Hr) IV Continuous <Continuous>  thiamine 200 milliGRAM(s) Oral daily  tocilizumab IVPB 400 milliGRAM(s) IV Intermittent once  zinc sulfate 220 milliGRAM(s) Oral daily    MEDICATIONS  (PRN):  acetaminophen  Suppository .. 650 milliGRAM(s) Rectal every 4 hours PRN Temp greater or equal to 38.5C (101.3F)  ALBUTerol    90 MICROgram(s) HFA Inhaler 2 Puff(s) Inhalation every 4 hours PRN Shortness of Breath and/or Wheezing  guaifenesin/dextromethorphan  Syrup 10 milliLiter(s) Oral every 4 hours PRN Cough  melatonin 3 milliGRAM(s) Oral at bedtime PRN Insomnia  midazolam Injectable 2 milliGRAM(s) IV Push every 2 hours PRN agitation  sodium chloride 0.9% lock flush 10 milliLiter(s) IV Push every 1 hour PRN Pre/post blood products, medications, blood draw, and to maintain line patency      LABS:                        14.4   40.66 )-----------( 457      ( 12 Apr 2020 06:30 )             43.8     04-12    134<L>  |  98  |  38<H>  ----------------------------<  424<H>  4.8   |  23  |  0.88    Ca    9.1      12 Apr 2020 06:30    TPro  7.1  /  Alb  2.6<L>  /  TBili  0.6  /  DBili  0.2  /  AST  31  /  ALT  64<H>  /  AlkPhos  84  04-12    HIT ab -- 04-11 @ 14:32  HIT ab EIA --  D Dimer -482  HIT ab -- 04-10 @ 08:04  HIT ab EIA --  D Dimer -761  HIT ab -- 04-07 @ 07:54  HIT ab EIA --  D Dimer -422  HIT ab -- 04-06 @ 03:23  HIT ab EIA --  D Dimer -349    CARDIAC MARKERS ( 12 Apr 2020 06:30 )  <.017 ng/mL / x     / x     / x     / x                Procalcitonin, Serum: 0.26 ng/mL (04-12-20 @ 06:30)  Procalcitonin, Serum: 0.06 ng/mL (04-11-20 @ 14:32)  Procalcitonin, Serum: 0.03 ng/mL (04-10-20 @ 08:04)          CULTURES: (if applicable)    Culture - Blood (collected 04-06-20 @ 03:18)  Source: .Blood Blood-Peripheral  Final Report (04-10-20 @ 09:01):    No Growth Final    Culture - Blood (collected 04-06-20 @ 03:18)  Source: .Blood Blood-Peripheral  Final Report (04-10-20 @ 09:01):    No Growth Final          ABG - ( 12 Apr 2020 00:51 )  pH, Arterial: 7.32  pH, Blood: x     /  pCO2: 47    /  pO2: 98    / HCO3: x     / Base Excess: x     /  SaO2: 96                CAPILLARY BLOOD GLUCOSE      POCT Blood Glucose.: 384 mg/dL (12 Apr 2020 07:11)      RADIOLOGY  CXR:      CT:    ECHO:      VITALS:  T(C): 36.6 (04-12-20 @ 09:00), Max: 37.2 (04-12-20 @ 07:00)  T(F): 97.9 (04-12-20 @ 09:00), Max: 98.9 (04-12-20 @ 07:00)  HR: 70 (04-12-20 @ 10:00) (68 - 133)  BP: 124/58 (04-12-20 @ 10:00) (71/46 - 177/78)  BP(mean): 75 (04-12-20 @ 10:00) (52 - 104)  ABP: --  ABP(mean): --  RR: 29 (04-12-20 @ 10:00) (20 - 30)  SpO2: 98% (04-12-20 @ 10:00) (82% - 100%)  CVP(mm Hg): --  CVP(cm H2O): --    Ins and Outs     04-11-20 @ 07:01  -  04-12-20 @ 07:00  --------------------------------------------------------  IN: 33.9 mL / OUT: 700 mL / NET: -666.1 mL    04-12-20 @ 07:01  -  04-12-20 @ 11:59  --------------------------------------------------------  IN: 101.7 mL / OUT: 420 mL / NET: -318.3 mL            Device: 840, Mode: AC/ CMV (Assist Control/ Continuous Mandatory Ventilation), RR (machine): 28, RR (patient): 32, TV (machine): 380, TV (patient): 507, FiO2: 60, PEEP: 5, ITime: 1, MAP: 14, PIP: 26    I&O's Detail    11 Apr 2020 07:01  -  12 Apr 2020 07:00  --------------------------------------------------------  IN:    midazolam Infusion: 7.3 mL    norepinephrine Infusion: 4.8 mL    propofol Infusion: 21.8 mL  Total IN: 33.9 mL    OUT:    Indwelling Catheter - Urethral: 400 mL    Voided: 300 mL  Total OUT: 700 mL    Total NET: -666.1 mL      12 Apr 2020 07:01  -  12 Apr 2020 11:59  --------------------------------------------------------  IN:    midazolam Infusion: 21.9 mL    norepinephrine Infusion: 14.4 mL    propofol Infusion: 65.4 mL  Total IN: 101.7 mL    OUT:    Indwelling Catheter - Urethral: 120 mL    Voided: 300 mL  Total OUT: 420 mL    Total NET: -318.3 mL

## 2020-04-12 NOTE — PROVIDER CONTACT NOTE (EICU) - BACKGROUND
69M with a history of CLL, DM admitted for COVID-19, finished course of plaquenil developed worsening hypoxic resp failure, s/p intubation.  Concerned of bacterial coinfection (hospital day 6)

## 2020-04-13 LAB
ALBUMIN SERPL ELPH-MCNC: 2.1 G/DL — LOW (ref 3.3–5)
ALP SERPL-CCNC: 69 U/L — SIGNIFICANT CHANGE UP (ref 40–120)
ALT FLD-CCNC: 41 U/L — SIGNIFICANT CHANGE UP (ref 10–45)
ANION GAP SERPL CALC-SCNC: 6 MMOL/L — SIGNIFICANT CHANGE UP (ref 5–17)
AST SERPL-CCNC: 17 U/L — SIGNIFICANT CHANGE UP (ref 10–40)
BASOPHILS # BLD AUTO: 0.05 K/UL — SIGNIFICANT CHANGE UP (ref 0–0.2)
BASOPHILS NFR BLD AUTO: 0.3 % — SIGNIFICANT CHANGE UP (ref 0–2)
BILIRUB SERPL-MCNC: 0.4 MG/DL — SIGNIFICANT CHANGE UP (ref 0.2–1.2)
BUN SERPL-MCNC: 25 MG/DL — HIGH (ref 7–23)
CALCIUM SERPL-MCNC: 8.4 MG/DL — SIGNIFICANT CHANGE UP (ref 8.4–10.5)
CHLORIDE SERPL-SCNC: 105 MMOL/L — SIGNIFICANT CHANGE UP (ref 96–108)
CO2 SERPL-SCNC: 29 MMOL/L — SIGNIFICANT CHANGE UP (ref 22–31)
CREAT SERPL-MCNC: 0.55 MG/DL — SIGNIFICANT CHANGE UP (ref 0.5–1.3)
EOSINOPHIL # BLD AUTO: 0.02 K/UL — SIGNIFICANT CHANGE UP (ref 0–0.5)
EOSINOPHIL NFR BLD AUTO: 0.1 % — SIGNIFICANT CHANGE UP (ref 0–6)
GLUCOSE BLDC GLUCOMTR-MCNC: 207 MG/DL — HIGH (ref 70–99)
GLUCOSE BLDC GLUCOMTR-MCNC: 210 MG/DL — HIGH (ref 70–99)
GLUCOSE BLDC GLUCOMTR-MCNC: 245 MG/DL — HIGH (ref 70–99)
GLUCOSE BLDC GLUCOMTR-MCNC: 267 MG/DL — HIGH (ref 70–99)
GLUCOSE BLDC GLUCOMTR-MCNC: 282 MG/DL — HIGH (ref 70–99)
GLUCOSE SERPL-MCNC: 232 MG/DL — HIGH (ref 70–99)
HCT VFR BLD CALC: 39.8 % — SIGNIFICANT CHANGE UP (ref 39–50)
HGB BLD-MCNC: 12.9 G/DL — LOW (ref 13–17)
IMM GRANULOCYTES NFR BLD AUTO: 1.2 % — SIGNIFICANT CHANGE UP (ref 0–1.5)
LYMPHOCYTES # BLD AUTO: 45.7 % — HIGH (ref 13–44)
LYMPHOCYTES # BLD AUTO: 8.58 K/UL — HIGH (ref 1–3.3)
MAGNESIUM SERPL-MCNC: 2.3 MG/DL — SIGNIFICANT CHANGE UP (ref 1.6–2.6)
MCHC RBC-ENTMCNC: 29.1 PG — SIGNIFICANT CHANGE UP (ref 27–34)
MCHC RBC-ENTMCNC: 32.4 GM/DL — SIGNIFICANT CHANGE UP (ref 32–36)
MCV RBC AUTO: 89.8 FL — SIGNIFICANT CHANGE UP (ref 80–100)
MONOCYTES # BLD AUTO: 0.27 K/UL — SIGNIFICANT CHANGE UP (ref 0–0.9)
MONOCYTES NFR BLD AUTO: 1.4 % — LOW (ref 2–14)
NEUTROPHILS # BLD AUTO: 9.64 K/UL — HIGH (ref 1.8–7.4)
NEUTROPHILS NFR BLD AUTO: 51.3 % — SIGNIFICANT CHANGE UP (ref 43–77)
NRBC # BLD: 0 /100 WBCS — SIGNIFICANT CHANGE UP (ref 0–0)
PHOSPHATE SERPL-MCNC: 3 MG/DL — SIGNIFICANT CHANGE UP (ref 2.5–4.5)
PLATELET # BLD AUTO: 361 K/UL — SIGNIFICANT CHANGE UP (ref 150–400)
POTASSIUM SERPL-MCNC: 4.3 MMOL/L — SIGNIFICANT CHANGE UP (ref 3.5–5.3)
POTASSIUM SERPL-SCNC: 4.3 MMOL/L — SIGNIFICANT CHANGE UP (ref 3.5–5.3)
PROT SERPL-MCNC: 6.1 G/DL — SIGNIFICANT CHANGE UP (ref 6–8.3)
RBC # BLD: 4.43 M/UL — SIGNIFICANT CHANGE UP (ref 4.2–5.8)
RBC # FLD: 13.4 % — SIGNIFICANT CHANGE UP (ref 10.3–14.5)
SODIUM SERPL-SCNC: 140 MMOL/L — SIGNIFICANT CHANGE UP (ref 135–145)
WBC # BLD: 18.78 K/UL — HIGH (ref 3.8–10.5)
WBC # FLD AUTO: 18.78 K/UL — HIGH (ref 3.8–10.5)

## 2020-04-13 PROCEDURE — 71045 X-RAY EXAM CHEST 1 VIEW: CPT | Mod: 26

## 2020-04-13 RX ORDER — DEXMEDETOMIDINE HYDROCHLORIDE IN 0.9% SODIUM CHLORIDE 4 UG/ML
0.05 INJECTION INTRAVENOUS
Qty: 200 | Refills: 0 | Status: DISCONTINUED | OUTPATIENT
Start: 2020-04-13 | End: 2020-04-14

## 2020-04-13 RX ORDER — MORPHINE SULFATE 50 MG/1
2 CAPSULE, EXTENDED RELEASE ORAL EVERY 4 HOURS
Refills: 0 | Status: DISCONTINUED | OUTPATIENT
Start: 2020-04-13 | End: 2020-04-14

## 2020-04-13 RX ORDER — INSULIN GLARGINE 100 [IU]/ML
28 INJECTION, SOLUTION SUBCUTANEOUS AT BEDTIME
Refills: 0 | Status: DISCONTINUED | OUTPATIENT
Start: 2020-04-13 | End: 2020-04-16

## 2020-04-13 RX ADMIN — MORPHINE SULFATE 2 MILLIGRAM(S): 50 CAPSULE, EXTENDED RELEASE ORAL at 15:17

## 2020-04-13 RX ADMIN — Medication 6: at 12:35

## 2020-04-13 RX ADMIN — CHLORHEXIDINE GLUCONATE 15 MILLILITER(S): 213 SOLUTION TOPICAL at 17:41

## 2020-04-13 RX ADMIN — PANTOPRAZOLE SODIUM 40 MILLIGRAM(S): 20 TABLET, DELAYED RELEASE ORAL at 12:31

## 2020-04-13 RX ADMIN — Medication 4: at 06:00

## 2020-04-13 RX ADMIN — ENOXAPARIN SODIUM 40 MILLIGRAM(S): 100 INJECTION SUBCUTANEOUS at 05:16

## 2020-04-13 RX ADMIN — Medication 4: at 23:40

## 2020-04-13 RX ADMIN — CHLORHEXIDINE GLUCONATE 1 APPLICATION(S): 213 SOLUTION TOPICAL at 05:15

## 2020-04-13 RX ADMIN — MIDAZOLAM HYDROCHLORIDE 5.08 MG/KG/HR: 1 INJECTION, SOLUTION INTRAMUSCULAR; INTRAVENOUS at 06:55

## 2020-04-13 RX ADMIN — Medication 6: at 17:14

## 2020-04-13 RX ADMIN — DEXMEDETOMIDINE HYDROCHLORIDE IN 0.9% SODIUM CHLORIDE 0.91 MICROGRAM(S)/KG/HR: 4 INJECTION INTRAVENOUS at 23:39

## 2020-04-13 RX ADMIN — DEXMEDETOMIDINE HYDROCHLORIDE IN 0.9% SODIUM CHLORIDE 0.91 MICROGRAM(S)/KG/HR: 4 INJECTION INTRAVENOUS at 20:36

## 2020-04-13 RX ADMIN — MIDODRINE HYDROCHLORIDE 10 MILLIGRAM(S): 2.5 TABLET ORAL at 21:54

## 2020-04-13 RX ADMIN — Medication 4: at 00:10

## 2020-04-13 RX ADMIN — SENNA PLUS 2 TABLET(S): 8.6 TABLET ORAL at 21:54

## 2020-04-13 RX ADMIN — MIDODRINE HYDROCHLORIDE 10 MILLIGRAM(S): 2.5 TABLET ORAL at 12:33

## 2020-04-13 RX ADMIN — CHLORHEXIDINE GLUCONATE 15 MILLILITER(S): 213 SOLUTION TOPICAL at 05:15

## 2020-04-13 RX ADMIN — ENOXAPARIN SODIUM 40 MILLIGRAM(S): 100 INJECTION SUBCUTANEOUS at 17:41

## 2020-04-13 RX ADMIN — Medication 40 MILLIGRAM(S): at 17:01

## 2020-04-13 RX ADMIN — INSULIN GLARGINE 28 UNIT(S): 100 INJECTION, SOLUTION SUBCUTANEOUS at 21:55

## 2020-04-13 RX ADMIN — Medication 40 MILLIGRAM(S): at 05:16

## 2020-04-13 RX ADMIN — MIDODRINE HYDROCHLORIDE 10 MILLIGRAM(S): 2.5 TABLET ORAL at 05:17

## 2020-04-13 NOTE — PROGRESS NOTE ADULT - ASSESSMENT
Physical Examination:  GENERAL:               Sedated and intubated  HEENT:                    Pupils equal, reactive to light.  Symmetric.   PULM:                     Bilateral air entry, coarse breath sounds  CVS:                         S1, S2,  No Murmur  ABD:                        Soft, nondistended, nontender, normoactive bowel sounds,   EXT:                         No edema, nontender, No Cyanosis or Clubbing   Vascular:                 Warm Extremities, Normal Capillary refill, Normal Distal Pulses  SKIN:                       Warm and well perfused, no rashes noted.   NEURO:                  Sedated, withdraws to noxious stimuli  PSYC:                      No insight    Assessment:  1. Acute respiratory failure  2. Severe sepsis  3. Viral pneumonia secondary to COVID 19  4. CLL  5. Diabetes Mellitus    Plan  - Mechanical ventilation with lung protective strategy   - Titrate Fio2 and PEEP as tolerated  - Sedation for ventilator synchrony   - Vasopressor support to maintain MAP > 60  - Monitor ABG, repeat CXR today  - Completed Hydroxychloroquine  - IV steroids and Tocilizumab 4/12  - Basal/bolus insulin regimen with sliding scale for hyperglycemia coverage  - Airborne and Contact isolation  - Monitor urine output  - Keep close to euvolemic volume status   - Gentle hydration  - DVT and Stress Ulcer prophylaxis   - Son Kwasi [phone] called 4/13 - message left for call back  + Abhilash

## 2020-04-13 NOTE — PROGRESS NOTE ADULT - SUBJECTIVE AND OBJECTIVE BOX
Follow-up Critical Care Progress Note  Chief Complaint : Acute respiratory failure with hypoxia    Wakening, mild distress. Reported with difficulty feeding with NGT    Allergies :No Known Allergies    PAST MEDICAL & SURGICAL HISTORY:  CLL (chronic lymphocytic leukemia)  DM (diabetes mellitus)  No significant past surgical history    Medications:  MEDICATIONS  (STANDING):  chlorhexidine 0.12% Liquid 15 milliLiter(s) Oral Mucosa every 12 hours  chlorhexidine 4% Liquid 1 Application(s) Topical <User Schedule>  dexMEDEtomidine Infusion 0.05 MICROgram(s)/kG/Hr (0.91 mL/Hr) IV Continuous <Continuous>  dextrose 50% Injectable 12.5 Gram(s) IV Push once  dextrose 50% Injectable 25 Gram(s) IV Push once  dextrose 50% Injectable 25 Gram(s) IV Push once  enoxaparin Injectable 40 milliGRAM(s) SubCutaneous every 12 hours  insulin glargine Injectable (LANTUS) 28 Unit(s) SubCutaneous at bedtime  insulin lispro (HumaLOG) corrective regimen sliding scale   SubCutaneous every 6 hours  methylPREDNISolone sodium succinate Injectable 40 milliGRAM(s) IV Push every 12 hours  midodrine 10 milliGRAM(s) Oral every 8 hours  pantoprazole  Injectable 40 milliGRAM(s) IV Push daily  propofol Infusion 10 MICROgram(s)/kG/Min (4.36 mL/Hr) IV Continuous <Continuous>  senna 2 Tablet(s) Oral at bedtime    MEDICATIONS  (PRN):  acetaminophen  Suppository .. 650 milliGRAM(s) Rectal every 4 hours PRN Temp greater or equal to 38.5C (101.3F)  ALBUTerol    90 MICROgram(s) HFA Inhaler 2 Puff(s) Inhalation every 4 hours PRN Shortness of Breath and/or Wheezing  sodium chloride 0.9% lock flush 10 milliLiter(s) IV Push every 1 hour PRN Pre/post blood products, medications, blood draw, and to maintain line patency      LABS:                        12.9   18.78 )-----------( 361      ( 13 Apr 2020 05:50 )             39.8     04-13    140  |  105  |  25<H>  ----------------------------<  232<H>  4.3   |  29  |  0.55    Ca    8.4      13 Apr 2020 05:50  Phos  3.0     04-13  Mg     2.3     04-13    TPro  6.1  /  Alb  2.1<L>  /  TBili  0.4  /  DBili  x   /  AST  17  /  ALT  41  /  AlkPhos  69  04-13    HIT ab -- 04-11 @ 14:32  HIT ab EIA --  D Dimer -482  HIT ab -- 04-10 @ 08:04  HIT ab EIA --  D Dimer -761  HIT ab -- 04-07 @ 07:54  HIT ab EIA --  D Dimer -422    CARDIAC MARKERS ( 12 Apr 2020 06:30 )  <.017 ng/mL / x     / x     / x     / x        Procalcitonin, Serum: 0.26 ng/mL (04-12-20 @ 06:30)  Procalcitonin, Serum: 0.06 ng/mL (04-11-20 @ 14:32)    CULTURES: (if applicable)    Culture - Blood (collected 04-06-20 @ 03:18)  Source: .Blood Blood-Peripheral  Final Report (04-10-20 @ 09:01):    No Growth Final    Culture - Blood (collected 04-06-20 @ 03:18)  Source: .Blood Blood-Peripheral  Final Report (04-10-20 @ 09:01):    No Growth Final    ABG - ( 12 Apr 2020 00:51 )  pH, Arterial: 7.32  pH, Blood: x     /  pCO2: 47    /  pO2: 98    / HCO3: x     / Base Excess: x     /  SaO2: 96        VITALS:  T(C): 36.8 (04-13-20 @ 08:00), Max: 37.1 (04-13-20 @ 00:00)  T(F): 98.2 (04-13-20 @ 08:00), Max: 98.7 (04-13-20 @ 00:00)  HR: 85 (04-13-20 @ 08:00) (63 - 85)  BP: 130/60 (04-13-20 @ 08:00) (89/52 - 140/65)  BP(mean): 78 (04-13-20 @ 08:00) (59 - 84)  ABP: --  ABP(mean): --  RR: 28 (04-13-20 @ 08:00) (20 - 34)  SpO2: 91% (04-13-20 @ 08:00) (90% - 96%)  CVP(mm Hg): --  CVP(cm H2O): --    Ins and Outs     04-12-20 @ 07:01  -  04-13-20 @ 07:00  --------------------------------------------------------  IN: 1072.9 mL / OUT: 1080 mL / NET: -7.1 mL    Device: 840, Mode: AC/ CMV (Assist Control/ Continuous Mandatory Ventilation), RR (machine): 28, RR (patient): 31, TV (machine): 380, TV (patient): 390, FiO2: 40, PEEP: 5, ITime: 0.8, MAP: 13, PIP: 21    I&O's Detail    12 Apr 2020 07:01  -  13 Apr 2020 07:00  --------------------------------------------------------  IN:    lactated ringers.: 450 mL    midazolam Infusion: 80.3 mL    norepinephrine Infusion: 52.8 mL    Pivot 1.5: 130 mL    propofol Infusion: 239.8 mL    Solution: 120 mL  Total IN: 1072.9 mL    OUT:    Indwelling Catheter - Urethral: 780 mL    Voided: 300 mL  Total OUT: 1080 mL    Total NET: -7.1 mL

## 2020-04-13 NOTE — PROGRESS NOTE ADULT - SUBJECTIVE AND OBJECTIVE BOX
HPI: 69M COVID 19 +, developed worsening hypoxemic resp. failure, ARDS, and required intubation and mechanical vent support. Course further complicated by development of pressor dependent shock.       Current Vent Settings:    FIO2 40  PEEP 5  RR 28        Vitalsigns/reviewed and physical exam performed where pertinent and urgently required.    Lab/radiology studies/ABG/Meds reviewed and interpreted into the assesment and treatment plan.    Assessment/Plan/Therapeutic interventions      Neuro: Sedation neuromuscular blockade as needed to facilitate safe ventilation    Cor:  Pressor support as needed to maintain MAP 65.  Avoiding fluid challenges.  QTC monitoring while on azithro and      hydroxychloroquine. Also, will add midodrine via NGT in order to help wean from vasopressors    Pulm:  ARDS-NET 4-6cc/kg IBW TV as able to maintain plateau pressures <30. Prone ventilation consideration as feasable.  Pa02/Fi02 < 150 on Fi02 >60% and PEEP at least 5.    GI:  PPI  Enteric feeds as tolerated in tandem with NMB and prone ventilation    Renal: Even to negative fluid balance as tolerated by hemodynamics and renal fx.  Feeds to be provided in lieu of IVF.     Heme:  Pharmacologic DVT P in addition to SCD's    ID: ABX discontinuation based on discussion with ID in conjunction with clinical features, culture data, and judicious procalcitonin monitoring.      Endo Aggressive glycemic control to limit FS glucose to < 180mg/dl.        COVID 19 specific considerations and therapeuric options based on the available and rapidly changing literature    Goals of care considerations:  Ongoing assessment for patient specific treatment options based on progression or decline.  I have involved the family with updates and requests in guidance for medical decision making.      35 minutes of critical care time spent in the management of this critically ill COVID-19 patient/PUI patient with continuous assessments and interventions based on the interpretation of multiple databases. HPI: 69M COVID 19 +, developed worsening hypoxemic resp. failure, ARDS, and required intubation and mechanical vent support. Course further complicated by development of pressor dependent shock.       Current Vent Settings:    FIO2 40  PEEP 5  RR 28        Vitalsigns/reviewed and physical exam performed where pertinent and urgently required.    Lab/radiology studies/ABG/Meds reviewed and interpreted into the assesment and treatment plan.    Assessment/Plan/Therapeutic interventions      Neuro: Sedation neuromuscular blockade as needed to facilitate safe ventilation    Cor:  Pressor support as needed to maintain MAP 65.  Avoiding fluid challenges.   will add midodrine via NGT in order to help wean from vasopressors    Pulm:  ARDS-NET 4-6cc/kg IBW TV as able to maintain plateau pressures <30. Prone ventilation consideration as feasable.  Pa02/Fi02 < 150 on Fi02 >60% and PEEP at least 5.    GI:  PPI  Enteric feeds as tolerated in tandem with NMB and prone ventilation    Renal: Even to negative fluid balance as tolerated by hemodynamics and renal fx.  Feeds to be provided in lieu of IVF.     Heme:  Pharmacologic DVT P in addition to SCD's    ID: ABX discontinuation based on discussion with ID in conjunction with clinical features, culture data, and judicious procalcitonin monitoring.      Endo Aggressive glycemic control to limit FS glucose to < 180mg/dl.        COVID 19 specific considerations and therapeuric options based on the available and rapidly changing literature    Goals of care considerations:  Ongoing assessment for patient specific treatment options based on progression or decline.  I have involved the family with updates and requests in guidance for medical decision making.      35 minutes of critical care time spent in the management of this critically ill COVID-19 patient/PUI patient with continuous assessments and interventions based on the interpretation of multiple databases.

## 2020-04-14 LAB
ALBUMIN SERPL ELPH-MCNC: 2.3 G/DL — LOW (ref 3.3–5)
ALP SERPL-CCNC: 81 U/L — SIGNIFICANT CHANGE UP (ref 40–120)
ALT FLD-CCNC: 37 U/L — SIGNIFICANT CHANGE UP (ref 10–45)
ANION GAP SERPL CALC-SCNC: 5 MMOL/L — SIGNIFICANT CHANGE UP (ref 5–17)
AST SERPL-CCNC: 23 U/L — SIGNIFICANT CHANGE UP (ref 10–40)
BASOPHILS # BLD AUTO: 0.05 K/UL — SIGNIFICANT CHANGE UP (ref 0–0.2)
BASOPHILS NFR BLD AUTO: 0.2 % — SIGNIFICANT CHANGE UP (ref 0–2)
BILIRUB SERPL-MCNC: 0.4 MG/DL — SIGNIFICANT CHANGE UP (ref 0.2–1.2)
BUN SERPL-MCNC: 17 MG/DL — SIGNIFICANT CHANGE UP (ref 7–23)
CALCIUM SERPL-MCNC: 8.6 MG/DL — SIGNIFICANT CHANGE UP (ref 8.4–10.5)
CHLORIDE SERPL-SCNC: 108 MMOL/L — SIGNIFICANT CHANGE UP (ref 96–108)
CO2 SERPL-SCNC: 31 MMOL/L — SIGNIFICANT CHANGE UP (ref 22–31)
CREAT SERPL-MCNC: 0.49 MG/DL — LOW (ref 0.5–1.3)
EOSINOPHIL # BLD AUTO: 0.09 K/UL — SIGNIFICANT CHANGE UP (ref 0–0.5)
EOSINOPHIL NFR BLD AUTO: 0.4 % — SIGNIFICANT CHANGE UP (ref 0–6)
GLUCOSE BLDC GLUCOMTR-MCNC: 118 MG/DL — HIGH (ref 70–99)
GLUCOSE BLDC GLUCOMTR-MCNC: 124 MG/DL — HIGH (ref 70–99)
GLUCOSE BLDC GLUCOMTR-MCNC: 279 MG/DL — HIGH (ref 70–99)
GLUCOSE SERPL-MCNC: 137 MG/DL — HIGH (ref 70–99)
HCT VFR BLD CALC: 43.5 % — SIGNIFICANT CHANGE UP (ref 39–50)
HGB BLD-MCNC: 13.8 G/DL — SIGNIFICANT CHANGE UP (ref 13–17)
IL6 FLD-MCNC: 26 PG/ML — HIGH (ref 0–15.5)
IMM GRANULOCYTES NFR BLD AUTO: 0.8 % — SIGNIFICANT CHANGE UP (ref 0–1.5)
LYMPHOCYTES # BLD AUTO: 36.8 % — SIGNIFICANT CHANGE UP (ref 13–44)
LYMPHOCYTES # BLD AUTO: 8.64 K/UL — HIGH (ref 1–3.3)
MAGNESIUM SERPL-MCNC: 2.4 MG/DL — SIGNIFICANT CHANGE UP (ref 1.6–2.6)
MCHC RBC-ENTMCNC: 28.9 PG — SIGNIFICANT CHANGE UP (ref 27–34)
MCHC RBC-ENTMCNC: 31.7 GM/DL — LOW (ref 32–36)
MCV RBC AUTO: 91 FL — SIGNIFICANT CHANGE UP (ref 80–100)
MONOCYTES # BLD AUTO: 0.22 K/UL — SIGNIFICANT CHANGE UP (ref 0–0.9)
MONOCYTES NFR BLD AUTO: 0.9 % — LOW (ref 2–14)
NEUTROPHILS # BLD AUTO: 14.28 K/UL — HIGH (ref 1.8–7.4)
NEUTROPHILS NFR BLD AUTO: 60.9 % — SIGNIFICANT CHANGE UP (ref 43–77)
NRBC # BLD: 0 /100 WBCS — SIGNIFICANT CHANGE UP (ref 0–0)
PHOSPHATE SERPL-MCNC: 3.1 MG/DL — SIGNIFICANT CHANGE UP (ref 2.5–4.5)
PLATELET # BLD AUTO: 348 K/UL — SIGNIFICANT CHANGE UP (ref 150–400)
POTASSIUM SERPL-MCNC: 4.3 MMOL/L — SIGNIFICANT CHANGE UP (ref 3.5–5.3)
POTASSIUM SERPL-SCNC: 4.3 MMOL/L — SIGNIFICANT CHANGE UP (ref 3.5–5.3)
PROT SERPL-MCNC: 6.3 G/DL — SIGNIFICANT CHANGE UP (ref 6–8.3)
RBC # BLD: 4.78 M/UL — SIGNIFICANT CHANGE UP (ref 4.2–5.8)
RBC # FLD: 13.4 % — SIGNIFICANT CHANGE UP (ref 10.3–14.5)
SODIUM SERPL-SCNC: 144 MMOL/L — SIGNIFICANT CHANGE UP (ref 135–145)
WBC # BLD: 23.46 K/UL — HIGH (ref 3.8–10.5)
WBC # FLD AUTO: 23.46 K/UL — HIGH (ref 3.8–10.5)

## 2020-04-14 RX ORDER — CHLORHEXIDINE GLUCONATE 213 G/1000ML
15 SOLUTION TOPICAL EVERY 12 HOURS
Refills: 0 | Status: DISCONTINUED | OUTPATIENT
Start: 2020-04-14 | End: 2020-04-14

## 2020-04-14 RX ORDER — INSULIN LISPRO 100/ML
VIAL (ML) SUBCUTANEOUS
Refills: 0 | Status: DISCONTINUED | OUTPATIENT
Start: 2020-04-14 | End: 2020-04-19

## 2020-04-14 RX ADMIN — PROPOFOL 4.36 MICROGRAM(S)/KG/MIN: 10 INJECTION, EMULSION INTRAVENOUS at 06:35

## 2020-04-14 RX ADMIN — DEXMEDETOMIDINE HYDROCHLORIDE IN 0.9% SODIUM CHLORIDE 0.91 MICROGRAM(S)/KG/HR: 4 INJECTION INTRAVENOUS at 06:35

## 2020-04-14 RX ADMIN — PANTOPRAZOLE SODIUM 40 MILLIGRAM(S): 20 TABLET, DELAYED RELEASE ORAL at 12:18

## 2020-04-14 RX ADMIN — CHLORHEXIDINE GLUCONATE 15 MILLILITER(S): 213 SOLUTION TOPICAL at 17:01

## 2020-04-14 RX ADMIN — ENOXAPARIN SODIUM 40 MILLIGRAM(S): 100 INJECTION SUBCUTANEOUS at 17:02

## 2020-04-14 RX ADMIN — ENOXAPARIN SODIUM 40 MILLIGRAM(S): 100 INJECTION SUBCUTANEOUS at 05:36

## 2020-04-14 RX ADMIN — Medication 40 MILLIGRAM(S): at 17:01

## 2020-04-14 RX ADMIN — ALBUTEROL 2 PUFF(S): 90 AEROSOL, METERED ORAL at 21:17

## 2020-04-14 RX ADMIN — CHLORHEXIDINE GLUCONATE 1 APPLICATION(S): 213 SOLUTION TOPICAL at 05:39

## 2020-04-14 RX ADMIN — CHLORHEXIDINE GLUCONATE 15 MILLILITER(S): 213 SOLUTION TOPICAL at 05:36

## 2020-04-14 RX ADMIN — Medication 6: at 12:19

## 2020-04-14 RX ADMIN — SENNA PLUS 2 TABLET(S): 8.6 TABLET ORAL at 21:13

## 2020-04-14 RX ADMIN — Medication 40 MILLIGRAM(S): at 05:36

## 2020-04-14 RX ADMIN — MIDODRINE HYDROCHLORIDE 10 MILLIGRAM(S): 2.5 TABLET ORAL at 05:36

## 2020-04-14 RX ADMIN — Medication 0: at 05:38

## 2020-04-14 RX ADMIN — INSULIN GLARGINE 28 UNIT(S): 100 INJECTION, SOLUTION SUBCUTANEOUS at 21:13

## 2020-04-14 NOTE — PROGRESS NOTE ADULT - SUBJECTIVE AND OBJECTIVE BOX
Follow-up Critical Care Progress Note  Chief Complaint : Acute respiratory failure with hypoxia    Extubated this morning.     Allergies :No Known Allergies      PAST MEDICAL & SURGICAL HISTORY:  CLL (chronic lymphocytic leukemia)  DM (diabetes mellitus)  No significant past surgical history      Medications:  MEDICATIONS  (STANDING):  chlorhexidine 0.12% Liquid 15 milliLiter(s) Oral Mucosa every 12 hours  chlorhexidine 4% Liquid 1 Application(s) Topical <User Schedule>  dextrose 50% Injectable 12.5 Gram(s) IV Push once  dextrose 50% Injectable 25 Gram(s) IV Push once  dextrose 50% Injectable 25 Gram(s) IV Push once  enoxaparin Injectable 40 milliGRAM(s) SubCutaneous every 12 hours  insulin glargine Injectable (LANTUS) 28 Unit(s) SubCutaneous at bedtime  insulin lispro (HumaLOG) corrective regimen sliding scale   SubCutaneous every 6 hours  methylPREDNISolone sodium succinate Injectable 40 milliGRAM(s) IV Push every 12 hours  pantoprazole  Injectable 40 milliGRAM(s) IV Push daily  senna 2 Tablet(s) Oral at bedtime    MEDICATIONS  (PRN):  acetaminophen  Suppository .. 650 milliGRAM(s) Rectal every 4 hours PRN Temp greater or equal to 38.5C (101.3F)  ALBUTerol    90 MICROgram(s) HFA Inhaler 2 Puff(s) Inhalation every 4 hours PRN Shortness of Breath and/or Wheezing  sodium chloride 0.9% lock flush 10 milliLiter(s) IV Push every 1 hour PRN Pre/post blood products, medications, blood draw, and to maintain line patency      LABS:                        13.8   23.46 )-----------( 348      ( 14 Apr 2020 05:30 )             43.5     04-14    144  |  108  |  17  ----------------------------<  137<H>  4.3   |  31  |  0.49<L>    Ca    8.6      14 Apr 2020 05:30  Phos  3.1     04-14  Mg     2.4     04-14    TPro  6.3  /  Alb  2.3<L>  /  TBili  0.4  /  DBili  x   /  AST  23  /  ALT  37  /  AlkPhos  81  04-14    HIT ab -- 04-11 @ 14:32  HIT ab EIA --  D Dimer -482  HIT ab -- 04-10 @ 08:04  HIT ab EIA --  D Dimer -761    Procalcitonin, Serum: 0.26 ng/mL (04-12-20 @ 06:30)  Procalcitonin, Serum: 0.06 ng/mL (04-11-20 @ 14:32)  CULTURES: (if applicable)    Culture - Blood (collected 04-12-20 @ 06:30)  Source: .Blood Blood-Peripheral  Preliminary Report (04-13-20 @ 11:01):    No growth to date.    Culture - Blood (collected 04-06-20 @ 03:18)  Source: .Blood Blood-Peripheral  Final Report (04-10-20 @ 09:01):    No Growth Final    Culture - Blood (collected 04-06-20 @ 03:18)  Source: .Blood Blood-Peripheral  Final Report (04-10-20 @ 09:01):    No Growth Final    VITALS:  T(C): 37.1 (04-14-20 @ 08:00), Max: 37.1 (04-14-20 @ 08:00)  T(F): 98.7 (04-14-20 @ 08:00), Max: 98.7 (04-14-20 @ 08:00)  HR: 55 (04-14-20 @ 08:00) (55 - 98)  BP: 100/55 (04-14-20 @ 08:00) (86/45 - 111/55)  BP(mean): 67 (04-14-20 @ 08:00) (55 - 71)  ABP: --  ABP(mean): --  RR: 18 (04-14-20 @ 08:00) (17 - 33)  SpO2: 95% (04-14-20 @ 08:00) (92% - 98%)  CVP(mm Hg): --  CVP(cm H2O): --    Ins and Outs     04-13-20 @ 07:01  -  04-14-20 @ 07:00  --------------------------------------------------------  IN: 395.4 mL / OUT: 1250 mL / NET: -854.6 mL    Device: 840, Mode: CPAP with PS, RR (patient): 35, TV (patient): 580, FiO2: 40, PEEP: 5, PS: 5, MAP: 12, PIP: 24    I&O's Detail    13 Apr 2020 07:01  -  14 Apr 2020 07:00  --------------------------------------------------------  IN:    dexmedetomidine Infusion: 97.8 mL    Pivot 1.5: 180 mL    propofol Infusion: 117.6 mL  Total IN: 395.4 mL    OUT:    Indwelling Catheter - Urethral: 1250 mL  Total OUT: 1250 mL    Total NET: -854.6 mL

## 2020-04-14 NOTE — PROGRESS NOTE ADULT - ASSESSMENT
Physical Examination:  GENERAL:               Awake and following commands  HEENT:                    Pupils equal, reactive to light.  Symmetric.   PULM:                     Bilateral air entry, coarse breath sounds  CVS:                         S1, S2,  No Murmur  ABD:                        Soft, nondistended, nontender, normoactive bowel sounds,   EXT:                         No edema, nontender, No Cyanosis or Clubbing   Vascular:                 Warm Extremities, Normal Capillary refill, Normal Distal Pulses  SKIN:                       Warm and well perfused, no rashes noted.   NEURO:                  Following commands and moving all four extremities  PSYC:                      No insight    Assessment:  1. Acute respiratory failure  2. Severe sepsis  3. Viral pneumonia secondary to COVID 19  4. CLL  5. Diabetes Mellitus    Plan  - Monitor respiratory status post extubation.   - Completed Hydroxychloroquine  - IV steroids and Tocilizumab 4/12  - Basal/bolus insulin regimen with sliding scale for hyperglycemia coverage  - Airborne and Contact isolation  - Monitor urine output  - Keep close to euvolemic volume status   - Gentle hydration  - DVT and Stress Ulcer prophylaxis   - Son Kwasi [phone] called 4/14 - message left for call back  D/c romero

## 2020-04-14 NOTE — PROGRESS NOTE ADULT - SUBJECTIVE AND OBJECTIVE BOX
HPI:  68 yo M with type 2 DM, CLL with COVID 19 pneumonia rx with HC  came to ICU on the 11th with worsening resp status and hypoxemia.         Hosp day # 8  Vent day # 3    Sedate with precedex and propofol failed SBT  able to titrate to fio2 40% PEEP 4      Vitalsigns/reviewed and physical exam performed where pertinent and urgently required.    Lab/radiology studies/ABG/Meds reviewed and interpreted into the assesment and treatment plan.    Assessment/Plan/Therapeutic interventions      Neuro: Sedation  as needed to facilitate safe ventilation    Cor:  Pressor support as needed to maintain MAP 65.  Avoiding fluid challenges.      Pulm:  ARDS-NET 4-6cc/kg IBW TV as able to maintain plateau pressures <30. Prone ventilation consideration as feasable.  Pa02/Fi02 < 150 on Fi02 >60% and PEEP at least 5.  failing SBT's     GI:  PPI  Enteric feeds as tolerated     Renal: Even to negative fluid balance as tolerated by hemodynamics and renal fx.  Feeds to be provided in lieu of IVF.     Heme:  Pharmacologic DVT P in addition to SCD's    ID: ABX discontinuation based on discussion with ID in conjunction with clinical features, culture data, and judicious procalcitonin monitoring.      Endo Aggressive glycemic control to limit FS glucose to < 180mg/dl.        COVID 19 specific considerations and therapeuric options based on the available and rapidly changing literature    Goals of care considerations:  Ongoing assessment for patient specific treatment options based on progression or decline.  I have involved the family with updates and requests in guidance for medical decision making.      38 minutes of critical care time spent in the management of this critically ill COVID-19 patient/PUI patient with continuous assessments and interventions based on the interpretation of multiple databases.

## 2020-04-15 LAB
ALBUMIN SERPL ELPH-MCNC: 2.6 G/DL — LOW (ref 3.3–5)
ALP SERPL-CCNC: 100 U/L — SIGNIFICANT CHANGE UP (ref 40–120)
ALT FLD-CCNC: 45 U/L — SIGNIFICANT CHANGE UP (ref 10–45)
ANION GAP SERPL CALC-SCNC: 9 MMOL/L — SIGNIFICANT CHANGE UP (ref 5–17)
AST SERPL-CCNC: 45 U/L — HIGH (ref 10–40)
BASOPHILS # BLD AUTO: 0.06 K/UL — SIGNIFICANT CHANGE UP (ref 0–0.2)
BASOPHILS NFR BLD AUTO: 0.3 % — SIGNIFICANT CHANGE UP (ref 0–2)
BILIRUB SERPL-MCNC: 0.7 MG/DL — SIGNIFICANT CHANGE UP (ref 0.2–1.2)
BUN SERPL-MCNC: 21 MG/DL — SIGNIFICANT CHANGE UP (ref 7–23)
CALCIUM SERPL-MCNC: 8.5 MG/DL — SIGNIFICANT CHANGE UP (ref 8.4–10.5)
CHLORIDE SERPL-SCNC: 107 MMOL/L — SIGNIFICANT CHANGE UP (ref 96–108)
CO2 SERPL-SCNC: 28 MMOL/L — SIGNIFICANT CHANGE UP (ref 22–31)
CREAT SERPL-MCNC: 0.48 MG/DL — LOW (ref 0.5–1.3)
CRP SERPL-MCNC: 1.93 MG/DL — HIGH (ref 0–0.4)
EOSINOPHIL # BLD AUTO: 0.2 K/UL — SIGNIFICANT CHANGE UP (ref 0–0.5)
EOSINOPHIL NFR BLD AUTO: 1 % — SIGNIFICANT CHANGE UP (ref 0–6)
FERRITIN SERPL-MCNC: 933 NG/ML — HIGH (ref 30–400)
GLUCOSE BLDC GLUCOMTR-MCNC: 146 MG/DL — HIGH (ref 70–99)
GLUCOSE BLDC GLUCOMTR-MCNC: 150 MG/DL — HIGH (ref 70–99)
GLUCOSE BLDC GLUCOMTR-MCNC: 190 MG/DL — HIGH (ref 70–99)
GLUCOSE BLDC GLUCOMTR-MCNC: 193 MG/DL — HIGH (ref 70–99)
GLUCOSE BLDC GLUCOMTR-MCNC: 206 MG/DL — HIGH (ref 70–99)
GLUCOSE SERPL-MCNC: 135 MG/DL — HIGH (ref 70–99)
HCT VFR BLD CALC: 45.3 % — SIGNIFICANT CHANGE UP (ref 39–50)
HGB BLD-MCNC: 14.9 G/DL — SIGNIFICANT CHANGE UP (ref 13–17)
IMM GRANULOCYTES NFR BLD AUTO: 0.6 % — SIGNIFICANT CHANGE UP (ref 0–1.5)
LYMPHOCYTES # BLD AUTO: 43.4 % — SIGNIFICANT CHANGE UP (ref 13–44)
LYMPHOCYTES # BLD AUTO: 9.11 K/UL — HIGH (ref 1–3.3)
MAGNESIUM SERPL-MCNC: 2.1 MG/DL — SIGNIFICANT CHANGE UP (ref 1.6–2.6)
MCHC RBC-ENTMCNC: 29.3 PG — SIGNIFICANT CHANGE UP (ref 27–34)
MCHC RBC-ENTMCNC: 32.9 GM/DL — SIGNIFICANT CHANGE UP (ref 32–36)
MCV RBC AUTO: 89.2 FL — SIGNIFICANT CHANGE UP (ref 80–100)
MONOCYTES # BLD AUTO: 0.29 K/UL — SIGNIFICANT CHANGE UP (ref 0–0.9)
MONOCYTES NFR BLD AUTO: 1.4 % — LOW (ref 2–14)
NEUTROPHILS # BLD AUTO: 11.2 K/UL — HIGH (ref 1.8–7.4)
NEUTROPHILS NFR BLD AUTO: 53.3 % — SIGNIFICANT CHANGE UP (ref 43–77)
NRBC # BLD: 0 /100 WBCS — SIGNIFICANT CHANGE UP (ref 0–0)
PHOSPHATE SERPL-MCNC: 3.5 MG/DL — SIGNIFICANT CHANGE UP (ref 2.5–4.5)
PLATELET # BLD AUTO: 345 K/UL — SIGNIFICANT CHANGE UP (ref 150–400)
POTASSIUM SERPL-MCNC: 3.8 MMOL/L — SIGNIFICANT CHANGE UP (ref 3.5–5.3)
POTASSIUM SERPL-SCNC: 3.8 MMOL/L — SIGNIFICANT CHANGE UP (ref 3.5–5.3)
PROCALCITONIN SERPL-MCNC: 0.1 NG/ML — HIGH
PROT SERPL-MCNC: 6.4 G/DL — SIGNIFICANT CHANGE UP (ref 6–8.3)
RBC # BLD: 5.08 M/UL — SIGNIFICANT CHANGE UP (ref 4.2–5.8)
RBC # FLD: 13.4 % — SIGNIFICANT CHANGE UP (ref 10.3–14.5)
SODIUM SERPL-SCNC: 144 MMOL/L — SIGNIFICANT CHANGE UP (ref 135–145)
WBC # BLD: 20.99 K/UL — HIGH (ref 3.8–10.5)
WBC # FLD AUTO: 20.99 K/UL — HIGH (ref 3.8–10.5)

## 2020-04-15 RX ORDER — LANOLIN ALCOHOL/MO/W.PET/CERES
6 CREAM (GRAM) TOPICAL ONCE
Refills: 0 | Status: COMPLETED | OUTPATIENT
Start: 2020-04-15 | End: 2020-04-15

## 2020-04-15 RX ORDER — PANTOPRAZOLE SODIUM 20 MG/1
40 TABLET, DELAYED RELEASE ORAL DAILY
Refills: 0 | Status: DISCONTINUED | OUTPATIENT
Start: 2020-04-15 | End: 2020-04-15

## 2020-04-15 RX ORDER — PANTOPRAZOLE SODIUM 20 MG/1
40 TABLET, DELAYED RELEASE ORAL
Refills: 0 | Status: DISCONTINUED | OUTPATIENT
Start: 2020-04-15 | End: 2020-04-16

## 2020-04-15 RX ORDER — MORPHINE SULFATE 50 MG/1
0.5 CAPSULE, EXTENDED RELEASE ORAL EVERY 6 HOURS
Refills: 0 | Status: DISCONTINUED | OUTPATIENT
Start: 2020-04-15 | End: 2020-04-19

## 2020-04-15 RX ADMIN — ENOXAPARIN SODIUM 40 MILLIGRAM(S): 100 INJECTION SUBCUTANEOUS at 06:27

## 2020-04-15 RX ADMIN — ENOXAPARIN SODIUM 40 MILLIGRAM(S): 100 INJECTION SUBCUTANEOUS at 17:32

## 2020-04-15 RX ADMIN — CHLORHEXIDINE GLUCONATE 1 APPLICATION(S): 213 SOLUTION TOPICAL at 06:37

## 2020-04-15 RX ADMIN — SENNA PLUS 2 TABLET(S): 8.6 TABLET ORAL at 21:46

## 2020-04-15 RX ADMIN — Medication 2: at 12:00

## 2020-04-15 RX ADMIN — Medication 4: at 09:59

## 2020-04-15 RX ADMIN — Medication 6 MILLIGRAM(S): at 01:11

## 2020-04-15 RX ADMIN — PANTOPRAZOLE SODIUM 40 MILLIGRAM(S): 20 TABLET, DELAYED RELEASE ORAL at 17:32

## 2020-04-15 RX ADMIN — Medication 2: at 00:19

## 2020-04-15 RX ADMIN — Medication 40 MILLIGRAM(S): at 06:26

## 2020-04-15 RX ADMIN — INSULIN GLARGINE 28 UNIT(S): 100 INJECTION, SOLUTION SUBCUTANEOUS at 21:45

## 2020-04-15 RX ADMIN — Medication 0: at 21:58

## 2020-04-15 RX ADMIN — MORPHINE SULFATE 0.5 MILLIGRAM(S): 50 CAPSULE, EXTENDED RELEASE ORAL at 15:15

## 2020-04-15 NOTE — CHART NOTE - NSCHARTNOTEFT_GEN_A_CORE
Nutrition Follow Up Note  Hospital Course (Per Electronic Medical Record):   Source: Medical Record [X] MD [X] PA  [X] Nursing Staff [X]     Diet: Consistent Carbohydrate     Patient extubated yesterday , diet advanced , ? po intake at present , POCT noted , ? addition of po supplement if po intake noted poor .    Current Weight: (4/12) 139.3/63.2kg, ? weight status due to admit weight noted 160lbs     Pertinent Medications: MEDICATIONS  (STANDING):  chlorhexidine 4% Liquid 1 Application(s) Topical <User Schedule>  dextrose 50% Injectable 12.5 Gram(s) IV Push once  dextrose 50% Injectable 25 Gram(s) IV Push once  dextrose 50% Injectable 25 Gram(s) IV Push once  enoxaparin Injectable 40 milliGRAM(s) SubCutaneous every 12 hours  insulin glargine Injectable (LANTUS) 28 Unit(s) SubCutaneous at bedtime  insulin lispro (HumaLOG) corrective regimen sliding scale   SubCutaneous Before meals and at bedtime  methylPREDNISolone sodium succinate Injectable 40 milliGRAM(s) IV Push every 12 hours  pantoprazole  Injectable 40 milliGRAM(s) IV Push daily  senna 2 Tablet(s) Oral at bedtime    MEDICATIONS  (PRN):  acetaminophen  Suppository .. 650 milliGRAM(s) Rectal every 4 hours PRN Temp greater or equal to 38.5C (101.3F)  ALBUTerol    90 MICROgram(s) HFA Inhaler 2 Puff(s) Inhalation every 4 hours PRN Shortness of Breath and/or Wheezing  sodium chloride 0.9% lock flush 10 milliLiter(s) IV Push every 1 hour PRN Pre/post blood products, medications, blood draw, and to maintain line patency      Pertinent Labs:  04-15 Na144 mmol/L Glu 135 mg/dL<H> K+ 3.8 mmol/L Cr  0.48 mg/dL<L> BUN 21 mg/dL 04-15 Phos 3.5 mg/dL 04-15 Alb 2.6 g/dL<L> 04-07 [id number] 8.8 %<H>  POCT 193,124,279,118      Skin: intact    Edema: none noted    Last BM: on none documented    Estimated Needs:   [X] No Change since Previous Assessment      Previous Nutrition Diagnosis: Altered nutrition related labs     Nutrition Diagnosis is [X] Ongoing       New Nutrition Diagnosis: [X] Not Applicable    Interventions:   1. monitor po ? addition of po supplements   2. Bowel meds added     Monitoring & Evaluation: will monitor:  [X] Weights   [X] PO Intake   [X] Follow Up (Per Protocol)  [X] Tolerance to Diet Prescription       RD to follow as per Nutrition protocol  Patricia Hernandez RDN

## 2020-04-15 NOTE — PROGRESS NOTE ADULT - ASSESSMENT
Physical Examination:  GENERAL:               Awake and following commands  HEENT:                    Pupils equal, reactive to light.  Symmetric.   PULM:                     Bilateral air entry, coarse breath sounds  CVS:                         S1, S2,  No Murmur  ABD:                        Soft, nondistended, nontender, normoactive bowel sounds,   EXT:                         No edema, nontender, No Cyanosis or Clubbing   Vascular:                 Warm Extremities, Normal Capillary refill, Normal Distal Pulses  SKIN:                       Warm and well perfused, no rashes noted.   NEURO:                  Following commands and moving all four extremities  PSYC:                      No insight    Assessment:  1. Acute respiratory failure  2. Severe sepsis  3. Viral pneumonia secondary to COVID 19  4. CLL  5. Diabetes Mellitus    Plan  - Desaturates with activity, OOB to chair this morning  - Low dose morphine 0.5 mg prn  - Completed Hydroxychloroquine  - Completed IV steroids and Tocilizumab 4/12  - Basal/bolus insulin regimen with sliding scale for hyperglycemia coverage  - Airborne and Contact isolation  - Monitor urine output  - Keep close to euvolemic volume status   - Gentle hydration  - Oral diet  - DVT and Stress Ulcer prophylaxis   - Son Kwasi [phone] called 4/15  - D/c central line today

## 2020-04-16 LAB
ALBUMIN SERPL ELPH-MCNC: 3 G/DL — LOW (ref 3.3–5)
ALP SERPL-CCNC: 113 U/L — SIGNIFICANT CHANGE UP (ref 40–120)
ALT FLD-CCNC: 65 U/L — HIGH (ref 10–45)
ANION GAP SERPL CALC-SCNC: 10 MMOL/L — SIGNIFICANT CHANGE UP (ref 5–17)
AST SERPL-CCNC: 51 U/L — HIGH (ref 10–40)
BASOPHILS # BLD AUTO: 0.22 K/UL — HIGH (ref 0–0.2)
BASOPHILS NFR BLD AUTO: 1 % — SIGNIFICANT CHANGE UP (ref 0–2)
BILIRUB SERPL-MCNC: 0.8 MG/DL — SIGNIFICANT CHANGE UP (ref 0.2–1.2)
BLOOD GAS COMMENTS ARTERIAL: SIGNIFICANT CHANGE UP
BUN SERPL-MCNC: 30 MG/DL — HIGH (ref 7–23)
CALCIUM SERPL-MCNC: 8.8 MG/DL — SIGNIFICANT CHANGE UP (ref 8.4–10.5)
CHLORIDE SERPL-SCNC: 108 MMOL/L — SIGNIFICANT CHANGE UP (ref 96–108)
CO2 BLDA-SCNC: 27 MMOL/L — SIGNIFICANT CHANGE UP (ref 22–30)
CO2 SERPL-SCNC: 26 MMOL/L — SIGNIFICANT CHANGE UP (ref 22–31)
CREAT SERPL-MCNC: 0.54 MG/DL — SIGNIFICANT CHANGE UP (ref 0.5–1.3)
EOSINOPHIL # BLD AUTO: 0.43 K/UL — SIGNIFICANT CHANGE UP (ref 0–0.5)
EOSINOPHIL NFR BLD AUTO: 2 % — SIGNIFICANT CHANGE UP (ref 0–6)
GAS PNL BLDA: SIGNIFICANT CHANGE UP
GLUCOSE BLDC GLUCOMTR-MCNC: 160 MG/DL — HIGH (ref 70–99)
GLUCOSE BLDC GLUCOMTR-MCNC: 176 MG/DL — HIGH (ref 70–99)
GLUCOSE BLDC GLUCOMTR-MCNC: 191 MG/DL — HIGH (ref 70–99)
GLUCOSE BLDC GLUCOMTR-MCNC: 90 MG/DL — SIGNIFICANT CHANGE UP (ref 70–99)
GLUCOSE SERPL-MCNC: 125 MG/DL — HIGH (ref 70–99)
HCT VFR BLD CALC: 47.8 % — SIGNIFICANT CHANGE UP (ref 39–50)
HGB BLD-MCNC: 15.6 G/DL — SIGNIFICANT CHANGE UP (ref 13–17)
HOROWITZ INDEX BLDA+IHG-RTO: SIGNIFICANT CHANGE UP
LYMPHOCYTES # BLD AUTO: 38 % — SIGNIFICANT CHANGE UP (ref 13–44)
LYMPHOCYTES # BLD AUTO: 8.23 K/UL — HIGH (ref 1–3.3)
MAGNESIUM SERPL-MCNC: 2.2 MG/DL — SIGNIFICANT CHANGE UP (ref 1.6–2.6)
MCHC RBC-ENTMCNC: 28.9 PG — SIGNIFICANT CHANGE UP (ref 27–34)
MCHC RBC-ENTMCNC: 32.6 GM/DL — SIGNIFICANT CHANGE UP (ref 32–36)
MCV RBC AUTO: 88.7 FL — SIGNIFICANT CHANGE UP (ref 80–100)
MONOCYTES # BLD AUTO: 0.43 K/UL — SIGNIFICANT CHANGE UP (ref 0–0.9)
MONOCYTES NFR BLD AUTO: 2 % — SIGNIFICANT CHANGE UP (ref 2–14)
NEUTROPHILS # BLD AUTO: 12.35 K/UL — HIGH (ref 1.8–7.4)
NEUTROPHILS NFR BLD AUTO: 56 % — SIGNIFICANT CHANGE UP (ref 43–77)
PCO2 BLDA: 38 MMHG — SIGNIFICANT CHANGE UP (ref 32–46)
PH BLDA: 7.45 — SIGNIFICANT CHANGE UP (ref 7.35–7.45)
PHOSPHATE SERPL-MCNC: 3.3 MG/DL — SIGNIFICANT CHANGE UP (ref 2.5–4.5)
PLATELET # BLD AUTO: 290 K/UL — SIGNIFICANT CHANGE UP (ref 150–400)
PO2 BLDA: 62 MMHG — LOW (ref 74–108)
POTASSIUM SERPL-MCNC: 3.8 MMOL/L — SIGNIFICANT CHANGE UP (ref 3.5–5.3)
POTASSIUM SERPL-SCNC: 3.8 MMOL/L — SIGNIFICANT CHANGE UP (ref 3.5–5.3)
PROT SERPL-MCNC: 6.5 G/DL — SIGNIFICANT CHANGE UP (ref 6–8.3)
RBC # BLD: 5.39 M/UL — SIGNIFICANT CHANGE UP (ref 4.2–5.8)
RBC # FLD: 13.4 % — SIGNIFICANT CHANGE UP (ref 10.3–14.5)
SAO2 % BLDA: 90 % — LOW (ref 92–96)
SODIUM SERPL-SCNC: 144 MMOL/L — SIGNIFICANT CHANGE UP (ref 135–145)
WBC # BLD: 21.66 K/UL — HIGH (ref 3.8–10.5)
WBC # FLD AUTO: 21.66 K/UL — HIGH (ref 3.8–10.5)

## 2020-04-16 RX ORDER — INSULIN GLARGINE 100 [IU]/ML
20 INJECTION, SOLUTION SUBCUTANEOUS AT BEDTIME
Refills: 0 | Status: DISCONTINUED | OUTPATIENT
Start: 2020-04-16 | End: 2020-04-16

## 2020-04-16 RX ORDER — GUAIFENESIN/DEXTROMETHORPHAN 600MG-30MG
5 TABLET, EXTENDED RELEASE 12 HR ORAL EVERY 6 HOURS
Refills: 0 | Status: DISCONTINUED | OUTPATIENT
Start: 2020-04-16 | End: 2020-04-18

## 2020-04-16 RX ORDER — INSULIN GLARGINE 100 [IU]/ML
15 INJECTION, SOLUTION SUBCUTANEOUS AT BEDTIME
Refills: 0 | Status: DISCONTINUED | OUTPATIENT
Start: 2020-04-16 | End: 2020-04-19

## 2020-04-16 RX ORDER — PANTOPRAZOLE SODIUM 20 MG/1
40 TABLET, DELAYED RELEASE ORAL DAILY
Refills: 0 | Status: DISCONTINUED | OUTPATIENT
Start: 2020-04-16 | End: 2020-04-17

## 2020-04-16 RX ADMIN — CHLORHEXIDINE GLUCONATE 1 APPLICATION(S): 213 SOLUTION TOPICAL at 06:15

## 2020-04-16 RX ADMIN — Medication 2: at 17:34

## 2020-04-16 RX ADMIN — MORPHINE SULFATE 0.5 MILLIGRAM(S): 50 CAPSULE, EXTENDED RELEASE ORAL at 17:26

## 2020-04-16 RX ADMIN — Medication 5 MILLILITER(S): at 14:35

## 2020-04-16 RX ADMIN — INSULIN GLARGINE 15 UNIT(S): 100 INJECTION, SOLUTION SUBCUTANEOUS at 20:49

## 2020-04-16 RX ADMIN — ENOXAPARIN SODIUM 40 MILLIGRAM(S): 100 INJECTION SUBCUTANEOUS at 17:29

## 2020-04-16 RX ADMIN — MORPHINE SULFATE 0.5 MILLIGRAM(S): 50 CAPSULE, EXTENDED RELEASE ORAL at 00:30

## 2020-04-16 RX ADMIN — Medication 5 MILLILITER(S): at 23:25

## 2020-04-16 RX ADMIN — Medication 5 MILLILITER(S): at 17:30

## 2020-04-16 RX ADMIN — MORPHINE SULFATE 0.5 MILLIGRAM(S): 50 CAPSULE, EXTENDED RELEASE ORAL at 23:25

## 2020-04-16 RX ADMIN — PANTOPRAZOLE SODIUM 40 MILLIGRAM(S): 20 TABLET, DELAYED RELEASE ORAL at 06:15

## 2020-04-16 RX ADMIN — SENNA PLUS 2 TABLET(S): 8.6 TABLET ORAL at 20:48

## 2020-04-16 RX ADMIN — ENOXAPARIN SODIUM 40 MILLIGRAM(S): 100 INJECTION SUBCUTANEOUS at 06:14

## 2020-04-16 RX ADMIN — Medication 2: at 12:08

## 2020-04-16 RX ADMIN — Medication 2: at 21:06

## 2020-04-16 NOTE — PROGRESS NOTE ADULT - SUBJECTIVE AND OBJECTIVE BOX
Follow-up Critical Care Progress Note  Chief Complaint : Acute respiratory failure with hypoxia    pt seen and examined  pt oob to chair  on nrb sat 92-93%  when on bed and proned sat increase to 96%  mild dyspnea, states feeling better      Allergies :No Known Allergies      PAST MEDICAL & SURGICAL HISTORY:  CLL (chronic lymphocytic leukemia)  DM (diabetes mellitus)  No significant past surgical history      Medications:  MEDICATIONS  (STANDING):  chlorhexidine 4% Liquid 1 Application(s) Topical <User Schedule>  dextrose 50% Injectable 12.5 Gram(s) IV Push once  dextrose 50% Injectable 25 Gram(s) IV Push once  dextrose 50% Injectable 25 Gram(s) IV Push once  enoxaparin Injectable 40 milliGRAM(s) SubCutaneous every 12 hours  insulin glargine Injectable (LANTUS) 15 Unit(s) SubCutaneous at bedtime  insulin lispro (HumaLOG) corrective regimen sliding scale   SubCutaneous Before meals and at bedtime  pantoprazole   Suspension 40 milliGRAM(s) Oral daily  senna 2 Tablet(s) Oral at bedtime    MEDICATIONS  (PRN):  acetaminophen  Suppository .. 650 milliGRAM(s) Rectal every 4 hours PRN Temp greater or equal to 38.5C (101.3F)  ALBUTerol    90 MICROgram(s) HFA Inhaler 2 Puff(s) Inhalation every 4 hours PRN Shortness of Breath and/or Wheezing  morphine  - Injectable 0.5 milliGRAM(s) IV Push every 6 hours PRN Respiratory distress  sodium chloride 0.9% lock flush 10 milliLiter(s) IV Push every 1 hour PRN Pre/post blood products, medications, blood draw, and to maintain line patency      LABS:                        15.6   21.66 )-----------( 290      ( 16 Apr 2020 04:50 )             47.8     04-16    144  |  108  |  30<H>  ----------------------------<  125<H>  3.8   |  26  |  0.54    Ca    8.8      16 Apr 2020 04:50  Phos  3.3     04-16  Mg     2.2     04-16    TPro  6.5  /  Alb  3.0<L>  /  TBili  0.8  /  DBili  x   /  AST  51<H>  /  ALT  65<H>  /  AlkPhos  113  04-16    COVID  04-06-20 @ 03:18  COVID -   Detected<!!>      COVID Biomarkers    04-15-20 @ 04:45 ESR --  ---  CRP 1.93<H>  ---  DDimer  --   ---   LDH --   ---   Ferritin 933<H>    04-12-20 @ 06:30 ESR --  ---  CRP 16.41<H>  ---  DDimer  --   ---   LDH --   ---   Ferritin 941<H>    04-11-20 @ 14:32 ESR --  ---  CRP 11.60<H>  ---  DDimer  482<H>   ---   <H>   ---   Ferritin 910<H>    04-10-20 @ 08:04 ESR --  ---  CRP 3.77<H>  ---  DDimer  761<H>   ---   <H>   ---   Ferritin 897<H>    04-09-20 @ 07:31 ESR --  ---  CRP 6.53<H>  ---  DDimer  --   ---   LDH --   ---   Ferritin 1130<H>    04-07-20 @ 07:54 ESR --  ---  CRP 12.01<H>  ---  DDimer  422<H>   ---   <H>   ---   Ferritin 991<H>    04-06-20 @ 03:23 ESR --  ---  CRP 19.86<H>  ---  DDimer  349<H>   ---   <H>   ---   Ferritin 962<H>      Procalcitonin, Serum: 0.10 ng/mL (04-15-20 @ 04:45)      WBC Trend  04-16-20 @ 04:50   -  21.66<H>  04-15-20 @ 04:45   -  20.99<H>  04-14-20 @ 05:30   -  23.46<H>    H/H Trend  04-16-20 @ 04:50   -  15.6 / 47.8  04-15-20 @ 04:45   -  14.9 / 45.3  04-14-20 @ 05:30   -  13.8 / 43.5    Platelet Trend  04-16-20 @ 04:50   -  290  04-15-20 @ 04:45   -  345  04-14-20 @ 05:30   -  348    Trend Sodium  04-16-20 @ 04:50   -  144  04-15-20 @ 04:45   -  144  04-14-20 @ 05:30   -  144    Trend Potassium  04-16-20 @ 04:50   -  3.8  04-15-20 @ 04:45   -  3.8  04-14-20 @ 05:30   -  4.3    Trend Bun/Cr  04-16-20 @ 04:50  BUN/CR -  30<H> / 0.54  04-15-20 @ 04:45  BUN/CR -  21 / 0.48<L>  04-14-20 @ 05:30  BUN/CR -  17 / 0.49<L>    Lactic Acid Trend  04-06-20 @ 03:18   -   1.8    Trend AST/ALT/ALK Phos/Bili  04-16-20 @ 04:50   51<H>/65<H>/113/0.8  04-15-20 @ 04:45   45<H>/45/100/0.7  04-14-20 @ 05:30   23/37/81/0.4  04-13-20 @ 05:50   17/41/69/0.4  04-12-20 @ 06:30   31/64<H>/84/0.6  04-09-20 @ 07:31   46<H>/77<H>/91/0.8  04-08-20 @ 07:06   50<H>/57<H>/72/0.5  04-07-20 @ 07:54   55<H>/50<H>/71/0.4  04-06-20 @ 03:18   65<H>/44/76/0.6    Glucose Trend  04-16-20 @ 08:17   -  -- -- 90  04-16-20 @ 04:50   -  -- 125<H> --  04-15-20 @ 21:51   -  -- -- 150<H>  04-15-20 @ 17:34   -  -- -- 146<H>  04-15-20 @ 12:11   -  -- -- 190<H>  04-15-20 @ 09:58   -  -- -- 206<H>  04-15-20 @ 04:45   -  -- 135<H> --  04-14-20 @ 23:58   -  -- -- 193<H>  04-14-20 @ 17:04   -  -- -- 124<H>  04-14-20 @ 12:00   -  -- -- 279<H>    Hemoglobin A1C, Whole Blood: 8.8 % <H> (04-07-20 @ 07:54)    ABG Trend  04-16-20 @ 06:26   - 7.45/38/62<L>/90<L>  04-12-20 @ 00:51   - 7.32<L>/47<H>/98/96      CULTURES: (if applicable)    Culture - Blood (collected 04-12-20 @ 06:30)  Source: .Blood Blood-Peripheral  Preliminary Report (04-13-20 @ 11:01):    No growth to date.    Culture - Blood (collected 04-06-20 @ 03:18)  Source: .Blood Blood-Peripheral  Final Report (04-10-20 @ 09:01):    No Growth Final    Culture - Blood (collected 04-06-20 @ 03:18)  Source: .Blood Blood-Peripheral  Final Report (04-10-20 @ 09:01):    No Growth Final      ABG - ( 16 Apr 2020 06:26 )  pH, Arterial: 7.45  pH, Blood: x     /  pCO2: 38    /  pO2: 62    / HCO3: x     / Base Excess: x     /  SaO2: 90                VITALS:  T(C): 36.7 (04-16-20 @ 03:00), Max: 37.1 (04-15-20 @ 16:00)  T(F): 98.1 (04-16-20 @ 03:00), Max: 98.7 (04-15-20 @ 16:00)  HR: 108 (04-16-20 @ 10:32) (84 - 108)  BP: 129/63 (04-16-20 @ 10:32) (110/55 - 152/79)  BP(mean): 75 (04-16-20 @ 06:00) (67 - 98)  ABP: --  ABP(mean): --  RR: 38 (04-16-20 @ 10:32) (29 - 41)  SpO2: 95% (04-16-20 @ 10:32) (79% - 98%)  CVP(mm Hg): --  CVP(cm H2O): --    Ins and Outs     04-15-20 @ 07:01  -  04-16-20 @ 07:00  --------------------------------------------------------  IN: 180 mL / OUT: 500 mL / NET: -320 mL    04-16-20 @ 07:01  -  04-16-20 @ 11:50  --------------------------------------------------------  IN: 0 mL / OUT: 300 mL / NET: -300 mL                I&O's Detail    15 Apr 2020 07:01  -  16 Apr 2020 07:00  --------------------------------------------------------  IN:    Oral Fluid: 180 mL  Total IN: 180 mL    OUT:    Voided: 500 mL  Total OUT: 500 mL    Total NET: -320 mL      16 Apr 2020 07:01  -  16 Apr 2020 11:50  --------------------------------------------------------  IN:  Total IN: 0 mL    OUT:    Voided: 300 mL  Total OUT: 300 mL    Total NET: -300 mL

## 2020-04-16 NOTE — PROGRESS NOTE ADULT - ASSESSMENT
Physical Examination:  GENERAL:               Awake and following commands  HEENT:                    Pupils equal, reactive to light.  Symmetric.   NEURO:                  Following commands and moving all four extremities  PSYC:                      No insight    Assessment:  1. Acute respiratory failure Extubated 2/14  2. Severe sepsis  3. Viral pneumonia secondary to COVID 19  4. CLL  5. Diabetes Mellitus    Plan  - Desaturates with activity, OOB to chair this morning, proning  - Low dose morphine 0.5 mg prn   - Completed Hydroxychloroquine and IV steroids and Tocilizumab 4/12  - Basal/bolus insulin regimen with sliding scale for hyperglycemia coverage  - Airborne and Contact isolation  - Monitor urine output  - Keep close to euvolemic volume status   - Gentle hydration  - Oral diet  - DVT and Stress Ulcer prophylaxis   - Son Kwasi [phone] called 4/16  - Transfer to medical floor, case d/w Dr. Brady who will accept pt upon transfer. Statement Selected

## 2020-04-17 LAB
ALBUMIN SERPL ELPH-MCNC: 3.1 G/DL — LOW (ref 3.3–5)
ALP SERPL-CCNC: 147 U/L — HIGH (ref 40–120)
ALT FLD-CCNC: 59 U/L — HIGH (ref 10–45)
ANION GAP SERPL CALC-SCNC: 10 MMOL/L — SIGNIFICANT CHANGE UP (ref 5–17)
AST SERPL-CCNC: 48 U/L — HIGH (ref 10–40)
BASOPHILS # BLD AUTO: 0.08 K/UL — SIGNIFICANT CHANGE UP (ref 0–0.2)
BASOPHILS NFR BLD AUTO: 0.3 % — SIGNIFICANT CHANGE UP (ref 0–2)
BILIRUB SERPL-MCNC: 0.9 MG/DL — SIGNIFICANT CHANGE UP (ref 0.2–1.2)
BUN SERPL-MCNC: 25 MG/DL — HIGH (ref 7–23)
CALCIUM SERPL-MCNC: 8.9 MG/DL — SIGNIFICANT CHANGE UP (ref 8.4–10.5)
CHLORIDE SERPL-SCNC: 107 MMOL/L — SIGNIFICANT CHANGE UP (ref 96–108)
CO2 SERPL-SCNC: 25 MMOL/L — SIGNIFICANT CHANGE UP (ref 22–31)
CREAT SERPL-MCNC: 0.56 MG/DL — SIGNIFICANT CHANGE UP (ref 0.5–1.3)
CULTURE RESULTS: SIGNIFICANT CHANGE UP
EOSINOPHIL # BLD AUTO: 0.33 K/UL — SIGNIFICANT CHANGE UP (ref 0–0.5)
EOSINOPHIL NFR BLD AUTO: 1.3 % — SIGNIFICANT CHANGE UP (ref 0–6)
GLUCOSE BLDC GLUCOMTR-MCNC: 123 MG/DL — HIGH (ref 70–99)
GLUCOSE BLDC GLUCOMTR-MCNC: 149 MG/DL — HIGH (ref 70–99)
GLUCOSE BLDC GLUCOMTR-MCNC: 149 MG/DL — HIGH (ref 70–99)
GLUCOSE BLDC GLUCOMTR-MCNC: 92 MG/DL — SIGNIFICANT CHANGE UP (ref 70–99)
GLUCOSE SERPL-MCNC: 98 MG/DL — SIGNIFICANT CHANGE UP (ref 70–99)
HCT VFR BLD CALC: 47.8 % — SIGNIFICANT CHANGE UP (ref 39–50)
HGB BLD-MCNC: 15.7 G/DL — SIGNIFICANT CHANGE UP (ref 13–17)
IMM GRANULOCYTES NFR BLD AUTO: 0.9 % — SIGNIFICANT CHANGE UP (ref 0–1.5)
LYMPHOCYTES # BLD AUTO: 31.7 % — SIGNIFICANT CHANGE UP (ref 13–44)
LYMPHOCYTES # BLD AUTO: 8.24 K/UL — HIGH (ref 1–3.3)
MAGNESIUM SERPL-MCNC: 2 MG/DL — SIGNIFICANT CHANGE UP (ref 1.6–2.6)
MCHC RBC-ENTMCNC: 29.4 PG — SIGNIFICANT CHANGE UP (ref 27–34)
MCHC RBC-ENTMCNC: 32.8 GM/DL — SIGNIFICANT CHANGE UP (ref 32–36)
MCV RBC AUTO: 89.5 FL — SIGNIFICANT CHANGE UP (ref 80–100)
MONOCYTES # BLD AUTO: 0.36 K/UL — SIGNIFICANT CHANGE UP (ref 0–0.9)
MONOCYTES NFR BLD AUTO: 1.4 % — LOW (ref 2–14)
NEUTROPHILS # BLD AUTO: 16.76 K/UL — HIGH (ref 1.8–7.4)
NEUTROPHILS NFR BLD AUTO: 64.4 % — SIGNIFICANT CHANGE UP (ref 43–77)
NRBC # BLD: 0 /100 WBCS — SIGNIFICANT CHANGE UP (ref 0–0)
PHOSPHATE SERPL-MCNC: 3.9 MG/DL — SIGNIFICANT CHANGE UP (ref 2.5–4.5)
PLATELET # BLD AUTO: 234 K/UL — SIGNIFICANT CHANGE UP (ref 150–400)
POTASSIUM SERPL-MCNC: 3.6 MMOL/L — SIGNIFICANT CHANGE UP (ref 3.5–5.3)
POTASSIUM SERPL-SCNC: 3.6 MMOL/L — SIGNIFICANT CHANGE UP (ref 3.5–5.3)
PROT SERPL-MCNC: 6.5 G/DL — SIGNIFICANT CHANGE UP (ref 6–8.3)
RBC # BLD: 5.34 M/UL — SIGNIFICANT CHANGE UP (ref 4.2–5.8)
RBC # FLD: 13.7 % — SIGNIFICANT CHANGE UP (ref 10.3–14.5)
SODIUM SERPL-SCNC: 142 MMOL/L — SIGNIFICANT CHANGE UP (ref 135–145)
SPECIMEN SOURCE: SIGNIFICANT CHANGE UP
WBC # BLD: 26 K/UL — HIGH (ref 3.8–10.5)
WBC # FLD AUTO: 26 K/UL — HIGH (ref 3.8–10.5)

## 2020-04-17 PROCEDURE — 99233 SBSQ HOSP IP/OBS HIGH 50: CPT

## 2020-04-17 RX ORDER — PANTOPRAZOLE SODIUM 20 MG/1
40 TABLET, DELAYED RELEASE ORAL
Refills: 0 | Status: DISCONTINUED | OUTPATIENT
Start: 2020-04-17 | End: 2020-04-19

## 2020-04-17 RX ORDER — SENNA PLUS 8.6 MG/1
2 TABLET ORAL AT BEDTIME
Refills: 0 | Status: DISCONTINUED | OUTPATIENT
Start: 2020-04-17 | End: 2020-04-19

## 2020-04-17 RX ORDER — ACETAMINOPHEN 500 MG
650 TABLET ORAL EVERY 6 HOURS
Refills: 0 | Status: DISCONTINUED | OUTPATIENT
Start: 2020-04-17 | End: 2020-04-19

## 2020-04-17 RX ADMIN — SENNA PLUS 2 TABLET(S): 8.6 TABLET ORAL at 20:10

## 2020-04-17 RX ADMIN — ENOXAPARIN SODIUM 40 MILLIGRAM(S): 100 INJECTION SUBCUTANEOUS at 17:15

## 2020-04-17 RX ADMIN — PANTOPRAZOLE SODIUM 40 MILLIGRAM(S): 20 TABLET, DELAYED RELEASE ORAL at 11:46

## 2020-04-17 RX ADMIN — ENOXAPARIN SODIUM 40 MILLIGRAM(S): 100 INJECTION SUBCUTANEOUS at 04:56

## 2020-04-17 RX ADMIN — Medication 5 MILLILITER(S): at 11:45

## 2020-04-17 RX ADMIN — INSULIN GLARGINE 15 UNIT(S): 100 INJECTION, SOLUTION SUBCUTANEOUS at 20:10

## 2020-04-17 RX ADMIN — Medication 5 MILLILITER(S): at 04:57

## 2020-04-17 RX ADMIN — ALBUTEROL 2 PUFF(S): 90 AEROSOL, METERED ORAL at 21:18

## 2020-04-17 NOTE — PROGRESS NOTE ADULT - ASSESSMENT
69 Turkmen speaking male with hx of T2DM not on insulin, CLL presented with fevers, nonproductive cough and SOB x 3 days. SOB much worse and brought in by son. COVID + on 4/6/2020.     #Acute respiratory failure Extubated 2/14  #Viral pneumonia secondary to COVID 19  - Intubated 4/12, extubated 4/14.   - Completed Hydroxychloroquine and IV steroids and Tocilizumab 4/12  - Continues to require supplemental O2 (nasal cannula 6L plus NRB mask). Desaturates with activity. Will continue supplemental O2   - Continue low dose morphine 0.5 mg prn   - Continue albuterol MDI, robitussin     #Leukocytosis  - WBCs uptrending  - Afebrile  - Likely reactive vs. steroid induced   - Unknown baseline   - Will monitor    #Type 2 DM  - HbA1C 8.8  - Continue ISS  - Continue lantus 15u qhs     #Hx CLL  - outpatient f/u    DVT ppx  - lovenox    - Son Kwasi [phone] called 4/17 69 Croatian speaking male with hx of T2DM not on insulin, CLL presented with fevers, nonproductive cough and SOB x 3 days. SOB much worse and brought in by son. COVID + on 4/6/2020.     #Acute respiratory failure Extubated 4/14  #Viral pneumonia secondary to COVID 19  - Intubated 4/12, extubated 4/14.   - Completed Hydroxychloroquine and IV steroids and Tocilizumab 4/12  - Continues to require supplemental O2 (nasal cannula 6L plus NRB mask). Desaturates with activity. Will continue supplemental O2   - Continue low dose morphine 0.5 mg prn   - Continue albuterol MDI, robitussin   - encourage prone positioning     #Leukocytosis  - WBCs uptrending  - Afebrile  - Likely reactive vs. steroid induced   - Unknown baseline   - Will monitor    #Type 2 DM  - HbA1C 8.8  - Continue ISS  - Continue lantus 15u qhs     #Hx CLL  - outpatient f/u    DVT ppx  - lovenox    - Son Kwasi [phone] called 4/17

## 2020-04-17 NOTE — PROGRESS NOTE ADULT - SUBJECTIVE AND OBJECTIVE BOX
Patient is a 69y old  Male who presents with a chief complaint of fever, cough, hypoxia (16 Apr 2020 11:49)    Patient seen and examined at bedside. Attempted to speak to patient using  phone ( [place], [place] [id number]) but  unable to hear patient over sound of NRB mask. Patient currently on NRB mask and nasal cannula 6L, O2 sat 90-91% at rest, desaturates to mid 80s with exertion. Patient appears comfortable     ALLERGIES:  No Known Allergies    MEDICATIONS  (STANDING):  dextrose 50% Injectable 12.5 Gram(s) IV Push once  dextrose 50% Injectable 25 Gram(s) IV Push once  dextrose 50% Injectable 25 Gram(s) IV Push once  enoxaparin Injectable 40 milliGRAM(s) SubCutaneous every 12 hours  guaifenesin/dextromethorphan  Syrup 5 milliLiter(s) Oral every 6 hours  insulin glargine Injectable (LANTUS) 15 Unit(s) SubCutaneous at bedtime  insulin lispro (HumaLOG) corrective regimen sliding scale   SubCutaneous Before meals and at bedtime  pantoprazole   Suspension 40 milliGRAM(s) Oral daily  senna 2 Tablet(s) Oral at bedtime    MEDICATIONS  (PRN):  acetaminophen  Suppository .. 650 milliGRAM(s) Rectal every 4 hours PRN Temp greater or equal to 38.5C (101.3F)  ALBUTerol    90 MICROgram(s) HFA Inhaler 2 Puff(s) Inhalation every 4 hours PRN Shortness of Breath and/or Wheezing  morphine  - Injectable 0.5 milliGRAM(s) IV Push every 6 hours PRN Respiratory distress  sodium chloride 0.9% lock flush 10 milliLiter(s) IV Push every 1 hour PRN Pre/post blood products, medications, blood draw, and to maintain line patency    Vital Signs Last 24 Hrs  T(F): 98.5 (17 Apr 2020 08:33), Max: 98.5 (17 Apr 2020 08:33)  HR: 113 (17 Apr 2020 08:33) (96 - 113)  BP: 123/57 (17 Apr 2020 08:33) (114/102 - 154/76)  RR: 20 (17 Apr 2020 08:33) (20 - 20)  SpO2: 90% (17 Apr 2020 08:33) (90% - 93%)    I&O's Summary  16 Apr 2020 07:01  -  17 Apr 2020 07:00  --------------------------------------------------------  IN: 0 mL / OUT: 800 mL / NET: -800 mL    PHYSICAL EXAM:  General: NAD, alert  ENT: MMM  Neck: Supple, No JVD  Lungs: Coarse crackles to auscultation bilaterally, respirations unlabored   Cardio: RRR, S1/S2, No murmurs  Abdomen: Soft, Nontender, Nondistended; Bowel sounds present  Extremities: No calf tenderness, No pitting edema    LABS:                        15.7   26.00 )-----------( 234      ( 17 Apr 2020 06:20 )             47.8     04-17    142  |  107  |  25  ----------------------------<  98  3.6   |  25  |  0.56    Ca    8.9      17 Apr 2020 06:20  Phos  3.9     04-17  Mg     2.0     04-17    TPro  6.5  /  Alb  3.1  /  TBili  0.9  /  DBili  x   /  AST  48  /  ALT  59  /  AlkPhos  147  04-17    eGFR if Non : 106 mL/min/1.73M2 (04-17-20 @ 06:20)  eGFR if African American: 123 mL/min/1.73M2 (04-17-20 @ 06:20)    ABG - ( 16 Apr 2020 06:26 )  pH, Arterial: 7.45  pH, Blood: x     /  pCO2: 38    /  pO2: 62    / HCO3: x     / Base Excess: x     /  SaO2: 90        POCT Blood Glucose.: 149 mg/dL (17 Apr 2020 11:55)  POCT Blood Glucose.: 92 mg/dL (17 Apr 2020 07:53)  POCT Blood Glucose.: 191 mg/dL (16 Apr 2020 20:53)  POCT Blood Glucose.: 160 mg/dL (16 Apr 2020 17:32)    04-07 [id number] 8.8      Culture - Blood (collected 12 Apr 2020 06:30)  Source: .Blood Blood-Peripheral  Final Report (17 Apr 2020 11:01):    No Growth Final    RADIOLOGY & ADDITIONAL TESTS:    Care Discussed with Consultants/Other Providers:

## 2020-04-18 VITALS — TEMPERATURE: 102 F

## 2020-04-18 LAB
ALBUMIN SERPL ELPH-MCNC: 3.1 G/DL — LOW (ref 3.3–5)
ALP SERPL-CCNC: 203 U/L — HIGH (ref 40–120)
ALT FLD-CCNC: 47 U/L — HIGH (ref 10–45)
ANION GAP SERPL CALC-SCNC: 9 MMOL/L — SIGNIFICANT CHANGE UP (ref 5–17)
AST SERPL-CCNC: 41 U/L — HIGH (ref 10–40)
BASOPHILS # BLD AUTO: 0.06 K/UL — SIGNIFICANT CHANGE UP (ref 0–0.2)
BASOPHILS NFR BLD AUTO: 0.2 % — SIGNIFICANT CHANGE UP (ref 0–2)
BILIRUB SERPL-MCNC: 1 MG/DL — SIGNIFICANT CHANGE UP (ref 0.2–1.2)
BUN SERPL-MCNC: 20 MG/DL — SIGNIFICANT CHANGE UP (ref 7–23)
CALCIUM SERPL-MCNC: 8.9 MG/DL — SIGNIFICANT CHANGE UP (ref 8.4–10.5)
CHLORIDE SERPL-SCNC: 104 MMOL/L — SIGNIFICANT CHANGE UP (ref 96–108)
CO2 SERPL-SCNC: 26 MMOL/L — SIGNIFICANT CHANGE UP (ref 22–31)
CREAT SERPL-MCNC: 0.62 MG/DL — SIGNIFICANT CHANGE UP (ref 0.5–1.3)
CRP SERPL-MCNC: 0.95 MG/DL — HIGH (ref 0–0.4)
D DIMER BLD IA.RAPID-MCNC: 4876 NG/ML DDU — HIGH
EOSINOPHIL # BLD AUTO: 0.18 K/UL — SIGNIFICANT CHANGE UP (ref 0–0.5)
EOSINOPHIL NFR BLD AUTO: 0.6 % — SIGNIFICANT CHANGE UP (ref 0–6)
FERRITIN SERPL-MCNC: 644 NG/ML — HIGH (ref 30–400)
GLUCOSE BLDC GLUCOMTR-MCNC: 182 MG/DL — HIGH (ref 70–99)
GLUCOSE BLDC GLUCOMTR-MCNC: 199 MG/DL — HIGH (ref 70–99)
GLUCOSE BLDC GLUCOMTR-MCNC: 269 MG/DL — HIGH (ref 70–99)
GLUCOSE BLDC GLUCOMTR-MCNC: 98 MG/DL — SIGNIFICANT CHANGE UP (ref 70–99)
GLUCOSE SERPL-MCNC: 103 MG/DL — HIGH (ref 70–99)
HCT VFR BLD CALC: 49.8 % — SIGNIFICANT CHANGE UP (ref 39–50)
HGB BLD-MCNC: 16 G/DL — SIGNIFICANT CHANGE UP (ref 13–17)
IMM GRANULOCYTES NFR BLD AUTO: 1.1 % — SIGNIFICANT CHANGE UP (ref 0–1.5)
LDH SERPL L TO P-CCNC: 785 U/L — HIGH (ref 50–242)
LYMPHOCYTES # BLD AUTO: 27.6 % — SIGNIFICANT CHANGE UP (ref 13–44)
LYMPHOCYTES # BLD AUTO: 8.17 K/UL — HIGH (ref 1–3.3)
MAGNESIUM SERPL-MCNC: 2 MG/DL — SIGNIFICANT CHANGE UP (ref 1.6–2.6)
MCHC RBC-ENTMCNC: 28.9 PG — SIGNIFICANT CHANGE UP (ref 27–34)
MCHC RBC-ENTMCNC: 32.1 GM/DL — SIGNIFICANT CHANGE UP (ref 32–36)
MCV RBC AUTO: 89.9 FL — SIGNIFICANT CHANGE UP (ref 80–100)
MONOCYTES # BLD AUTO: 0.52 K/UL — SIGNIFICANT CHANGE UP (ref 0–0.9)
MONOCYTES NFR BLD AUTO: 1.8 % — LOW (ref 2–14)
NEUTROPHILS # BLD AUTO: 20.33 K/UL — HIGH (ref 1.8–7.4)
NEUTROPHILS NFR BLD AUTO: 68.7 % — SIGNIFICANT CHANGE UP (ref 43–77)
NRBC # BLD: 0 /100 WBCS — SIGNIFICANT CHANGE UP (ref 0–0)
PHOSPHATE SERPL-MCNC: 3.7 MG/DL — SIGNIFICANT CHANGE UP (ref 2.5–4.5)
PLATELET # BLD AUTO: 193 K/UL — SIGNIFICANT CHANGE UP (ref 150–400)
POTASSIUM SERPL-MCNC: 3.9 MMOL/L — SIGNIFICANT CHANGE UP (ref 3.5–5.3)
POTASSIUM SERPL-SCNC: 3.9 MMOL/L — SIGNIFICANT CHANGE UP (ref 3.5–5.3)
PROCALCITONIN SERPL-MCNC: 0.04 NG/ML — SIGNIFICANT CHANGE UP
PROT SERPL-MCNC: 6.7 G/DL — SIGNIFICANT CHANGE UP (ref 6–8.3)
RBC # BLD: 5.54 M/UL — SIGNIFICANT CHANGE UP (ref 4.2–5.8)
RBC # FLD: 13.8 % — SIGNIFICANT CHANGE UP (ref 10.3–14.5)
SODIUM SERPL-SCNC: 139 MMOL/L — SIGNIFICANT CHANGE UP (ref 135–145)
WBC # BLD: 29.59 K/UL — HIGH (ref 3.8–10.5)
WBC # FLD AUTO: 29.59 K/UL — HIGH (ref 3.8–10.5)

## 2020-04-18 PROCEDURE — 86480 TB TEST CELL IMMUN MEASURE: CPT

## 2020-04-18 PROCEDURE — 80048 BASIC METABOLIC PNL TOTAL CA: CPT

## 2020-04-18 PROCEDURE — 83036 HEMOGLOBIN GLYCOSYLATED A1C: CPT

## 2020-04-18 PROCEDURE — 94640 AIRWAY INHALATION TREATMENT: CPT

## 2020-04-18 PROCEDURE — 83529 ASAY OF INTERLEUKIN-6 (IL-6): CPT

## 2020-04-18 PROCEDURE — 82803 BLOOD GASES ANY COMBINATION: CPT

## 2020-04-18 PROCEDURE — 36415 COLL VENOUS BLD VENIPUNCTURE: CPT

## 2020-04-18 PROCEDURE — 87040 BLOOD CULTURE FOR BACTERIA: CPT

## 2020-04-18 PROCEDURE — 85385 FIBRINOGEN ANTIGEN: CPT

## 2020-04-18 PROCEDURE — 94002 VENT MGMT INPAT INIT DAY: CPT

## 2020-04-18 PROCEDURE — 99291 CRITICAL CARE FIRST HOUR: CPT | Mod: 25

## 2020-04-18 PROCEDURE — 80053 COMPREHEN METABOLIC PANEL: CPT

## 2020-04-18 PROCEDURE — 71045 X-RAY EXAM CHEST 1 VIEW: CPT

## 2020-04-18 PROCEDURE — 84100 ASSAY OF PHOSPHORUS: CPT

## 2020-04-18 PROCEDURE — 80076 HEPATIC FUNCTION PANEL: CPT

## 2020-04-18 PROCEDURE — 86140 C-REACTIVE PROTEIN: CPT

## 2020-04-18 PROCEDURE — 99233 SBSQ HOSP IP/OBS HIGH 50: CPT

## 2020-04-18 PROCEDURE — 84484 ASSAY OF TROPONIN QUANT: CPT

## 2020-04-18 PROCEDURE — 83735 ASSAY OF MAGNESIUM: CPT

## 2020-04-18 PROCEDURE — 83605 ASSAY OF LACTIC ACID: CPT

## 2020-04-18 PROCEDURE — 85027 COMPLETE CBC AUTOMATED: CPT

## 2020-04-18 PROCEDURE — 87635 SARS-COV-2 COVID-19 AMP PRB: CPT

## 2020-04-18 PROCEDURE — 86803 HEPATITIS C AB TEST: CPT

## 2020-04-18 PROCEDURE — 84145 PROCALCITONIN (PCT): CPT

## 2020-04-18 PROCEDURE — 93010 ELECTROCARDIOGRAM REPORT: CPT

## 2020-04-18 PROCEDURE — 94003 VENT MGMT INPAT SUBQ DAY: CPT

## 2020-04-18 PROCEDURE — 82728 ASSAY OF FERRITIN: CPT

## 2020-04-18 PROCEDURE — 85379 FIBRIN DEGRADATION QUANT: CPT

## 2020-04-18 PROCEDURE — 93005 ELECTROCARDIOGRAM TRACING: CPT

## 2020-04-18 PROCEDURE — 36600 WITHDRAWAL OF ARTERIAL BLOOD: CPT

## 2020-04-18 PROCEDURE — 94760 N-INVAS EAR/PLS OXIMETRY 1: CPT

## 2020-04-18 PROCEDURE — 82962 GLUCOSE BLOOD TEST: CPT

## 2020-04-18 PROCEDURE — 96360 HYDRATION IV INFUSION INIT: CPT

## 2020-04-18 PROCEDURE — 83615 LACTATE (LD) (LDH) ENZYME: CPT

## 2020-04-18 RX ORDER — CEFEPIME 1 G/1
2000 INJECTION, POWDER, FOR SOLUTION INTRAMUSCULAR; INTRAVENOUS ONCE
Refills: 0 | Status: COMPLETED | OUTPATIENT
Start: 2020-04-18 | End: 2020-04-18

## 2020-04-18 RX ORDER — CEFEPIME 1 G/1
INJECTION, POWDER, FOR SOLUTION INTRAMUSCULAR; INTRAVENOUS
Refills: 0 | Status: DISCONTINUED | OUTPATIENT
Start: 2020-04-18 | End: 2020-04-19

## 2020-04-18 RX ORDER — CEFEPIME 1 G/1
2000 INJECTION, POWDER, FOR SOLUTION INTRAMUSCULAR; INTRAVENOUS EVERY 8 HOURS
Refills: 0 | Status: DISCONTINUED | OUTPATIENT
Start: 2020-04-19 | End: 2020-04-19

## 2020-04-18 RX ORDER — ENOXAPARIN SODIUM 100 MG/ML
70 INJECTION SUBCUTANEOUS EVERY 12 HOURS
Refills: 0 | Status: DISCONTINUED | OUTPATIENT
Start: 2020-04-18 | End: 2020-04-19

## 2020-04-18 RX ORDER — GUAIFENESIN/DEXTROMETHORPHAN 600MG-30MG
5 TABLET, EXTENDED RELEASE 12 HR ORAL EVERY 6 HOURS
Refills: 0 | Status: DISCONTINUED | OUTPATIENT
Start: 2020-04-18 | End: 2020-04-19

## 2020-04-18 RX ADMIN — Medication 650 MILLIGRAM(S): at 19:38

## 2020-04-18 RX ADMIN — INSULIN GLARGINE 15 UNIT(S): 100 INJECTION, SOLUTION SUBCUTANEOUS at 22:51

## 2020-04-18 RX ADMIN — SENNA PLUS 2 TABLET(S): 8.6 TABLET ORAL at 21:55

## 2020-04-18 RX ADMIN — Medication 2: at 11:59

## 2020-04-18 RX ADMIN — Medication 6: at 22:52

## 2020-04-18 RX ADMIN — ENOXAPARIN SODIUM 40 MILLIGRAM(S): 100 INJECTION SUBCUTANEOUS at 17:24

## 2020-04-18 RX ADMIN — ENOXAPARIN SODIUM 40 MILLIGRAM(S): 100 INJECTION SUBCUTANEOUS at 04:10

## 2020-04-18 RX ADMIN — PANTOPRAZOLE SODIUM 40 MILLIGRAM(S): 20 TABLET, DELAYED RELEASE ORAL at 04:09

## 2020-04-18 RX ADMIN — Medication 2: at 17:23

## 2020-04-18 RX ADMIN — CEFEPIME 100 MILLIGRAM(S): 1 INJECTION, POWDER, FOR SOLUTION INTRAMUSCULAR; INTRAVENOUS at 19:38

## 2020-04-18 NOTE — PROGRESS NOTE ADULT - SUBJECTIVE AND OBJECTIVE BOX
Patient is a 69y old  Male who presents with a chief complaint of fever, cough, hypoxia (17 Apr 2020 14:48)    Patient seen and examined at bedside. Patient states that he feels ok, he appears comfortable. O2 sat 88-90% on 15L NRB mask and 10 L nasal cannula combination.      ALLERGIES:  No Known Allergies    MEDICATIONS  (STANDING):  dextrose 50% Injectable 12.5 Gram(s) IV Push once  dextrose 50% Injectable 25 Gram(s) IV Push once  dextrose 50% Injectable 25 Gram(s) IV Push once  enoxaparin Injectable 40 milliGRAM(s) SubCutaneous every 12 hours  guaifenesin/dextromethorphan  Syrup 5 milliLiter(s) Oral every 6 hours  insulin glargine Injectable (LANTUS) 15 Unit(s) SubCutaneous at bedtime  insulin lispro (HumaLOG) corrective regimen sliding scale   SubCutaneous Before meals and at bedtime  pantoprazole    Tablet 40 milliGRAM(s) Oral before breakfast  senna 2 Tablet(s) Oral at bedtime    MEDICATIONS  (PRN):  acetaminophen   Tablet .. 650 milliGRAM(s) Oral every 6 hours PRN Temp greater or equal to 38C (100.4F), Mild Pain (1 - 3), Moderate Pain (4 - 6)  ALBUTerol    90 MICROgram(s) HFA Inhaler 2 Puff(s) Inhalation every 4 hours PRN Shortness of Breath and/or Wheezing  morphine  - Injectable 0.5 milliGRAM(s) IV Push every 6 hours PRN Respiratory distress    Vital Signs Last 24 Hrs  T(F): 98.2 (18 Apr 2020 06:22), Max: 98.3 (18 Apr 2020 00:00)  HR: 121 (18 Apr 2020 08:43) (114 - 121)  BP: 145/73 (18 Apr 2020 06:22) (132/65 - 146/70)  RR: 22 (18 Apr 2020 08:43) (20 - 22)  SpO2: 83% (18 Apr 2020 08:43) (83% - 98%)    I&O's Summary  17 Apr 2020 07:01  -  18 Apr 2020 07:00  --------------------------------------------------------  IN: 0 mL / OUT: 1200 mL / NET: -1200 mL    PHYSICAL EXAM:  General: NAD, alert  ENT: MMM  Neck: Supple, No JVD  Lungs: Coarse crackles to auscultation bilaterally, respirations unlabored   Cardio: RRR, S1/S2, No murmurs  Abdomen: Soft, Nontender, Nondistended; Bowel sounds present  Extremities: No calf tenderness, No pitting edema    LABS:                        16.0   29.59 )-----------( 193      ( 18 Apr 2020 06:00 )             49.8     04-18    139  |  104  |  20  ----------------------------<  103  3.9   |  26  |  0.62    Ca    8.9      18 Apr 2020 06:00  Phos  3.7     04-18  Mg     2.0     04-18    TPro  6.7  /  Alb  3.1  /  TBili  1.0  /  DBili  x   /  AST  41  /  ALT  47  /  AlkPhos  203  04-18    eGFR if Non African American: 102 mL/min/1.73M2 (04-18-20 @ 06:00)  eGFR if : 118 mL/min/1.73M2 (04-18-20 @ 06:00)    ABG - ( 16 Apr 2020 06:26 )  pH, Arterial: 7.45  pH, Blood: x     /  pCO2: 38    /  pO2: 62    / HCO3: x     / Base Excess: x     /  SaO2: 90        POCT Blood Glucose.: 98 mg/dL (18 Apr 2020 08:26)  POCT Blood Glucose.: 149 mg/dL (17 Apr 2020 20:05)  POCT Blood Glucose.: 123 mg/dL (17 Apr 2020 16:53)  POCT Blood Glucose.: 149 mg/dL (17 Apr 2020 11:55)    04-07 [id number] 8.8    Culture - Blood (collected 12 Apr 2020 06:30)  Source: .Blood Blood-Peripheral  Final Report (17 Apr 2020 11:01):    No Growth Final    RADIOLOGY & ADDITIONAL TESTS:    Care Discussed with Consultants/Other Providers:

## 2020-04-18 NOTE — CHART NOTE - NSCHARTNOTEFT_GEN_A_CORE
Chart/Event Note  GC TELE 0228 D1 ( TELE)    Reason for Notification: tachycardia    Assessment: Called to bedside by RN as patient tachycardic to 130s.     On assessment, rectal temp 101.9. EKG with sinus tachycardia. Patient still on NRB 15L plus nasal cannula 10L, O2 sat greater than 90%.    70 yo male with PMH T2DM, CLL, here with COVID. Noted to have uptrending WBCs, fever. Previously afebrile. Intubated 4/12 - 4/14.     Plan:  - Tylenol  - Blood cultures x2  - Will start cefepime 2g q8 hrs  - ID consulted  - Will advance to treatment dose lovenox as concern for possible PE in COVID patient, though suspect tachycardia is 2/2 fever at this time     Discussed plan with hospitalist Dr Sukhjinder Jackson, NP Chart/Event Note  GC TELE 0228 D1 ( TELE)    Reason for Notification: tachycardia    Assessment: Called to bedside by RN as patient tachycardic to 130s.     On assessment, rectal temp 101.9. EKG with sinus tachycardia. Patient still on NRB 15L plus nasal cannula 10L, O2 sat greater than 90%.    70 yo male with PMH T2DM, CLL, here with COVID. Noted to have uptrending WBCs, fever, and increasing D-Dimer. Previously afebrile. Intubated 4/12 - 4/14.     Plan:  - Tylenol  - Blood cultures x2  - Will start cefepime 2g q8 hrs  - ID consulted  - Will advance to treatment dose lovenox as concern for possible PE in COVID patient, though suspect tachycardia is 2/2 fever at this time     Discussed plan with hospitalist Dr Sukhjinder Jackson, NP

## 2020-04-18 NOTE — PROGRESS NOTE ADULT - ASSESSMENT
69 Hungarian speaking male with hx of T2DM not on insulin, CLL presented with fevers, nonproductive cough and SOB x 3 days. SOB much worse and brought in by son. COVID + on 4/6/2020.     #Acute respiratory failure Extubated 4/14  #Viral pneumonia secondary to COVID 19  - Intubated 4/12, extubated 4/14.   - Completed Hydroxychloroquine and IV steroids and Tocilizumab 4/12  - Continues to require supplemental O2 (nasal cannula 10L plus NRB mask). Desaturates with activity. Will continue supplemental O2   - Continue low dose morphine 0.5 mg prn   - Continue albuterol MDI, robitussin   - encourage prone positioning, OOB to chair   - incentive spirometer    #Leukocytosis  - WBCs uptrending  - Afebrile  - Likely reactive vs. steroid induced   - Unknown baseline   - Will monitor    #Type 2 DM  - HbA1C 8.8  - Continue ISS  - Continue lantus 15u qhs     #Hx CLL  - outpatient f/u    DVT ppx  - lovenox 69 Chinese speaking male with hx of T2DM not on insulin, CLL presented with fevers, nonproductive cough and SOB x 3 days. SOB much worse and brought in by son. COVID + on 4/6/2020.     #Acute respiratory failure Extubated 4/14  #Viral pneumonia secondary to COVID 19  - Intubated 4/12, extubated 4/14.   - Completed Hydroxychloroquine and IV steroids and Tocilizumab 4/12  - Continues to require supplemental O2 (nasal cannula 10L plus NRB mask). Desaturates with activity. O2 sat improves to 92% with prone positioning. Will continue supplemental O2   - Continue low dose morphine 0.5 mg prn   - Continue albuterol MDI, robitussin   - encourage prone positioning, OOB to chair   - incentive spirometer    #Leukocytosis  - WBCs uptrending  - Afebrile  - Likely reactive vs. steroid induced   - Unknown baseline   - Will monitor    #Type 2 DM  - HbA1C 8.8  - Continue ISS  - Continue lantus 15u qhs     #Hx CLL  - outpatient f/u    DVT ppx  - lovenox 69 Turkmen speaking male with hx of T2DM not on insulin, CLL presented with fevers, nonproductive cough and SOB x 3 days. SOB much worse and brought in by son. COVID + on 4/6/2020.     #Acute respiratory failure Extubated 4/14  #Viral pneumonia secondary to COVID 19  - Intubated 4/12, extubated 4/14.   - Completed Hydroxychloroquine and IV steroids and Tocilizumab 4/12  - Continues to require supplemental O2 (nasal cannula 10L plus NRB mask). Desaturates with activity. O2 sat improves to 92% with prone positioning. Will continue supplemental O2   - Continue low dose morphine 0.5 mg prn   - Continue albuterol MDI, robitussin   - encourage prone positioning, OOB to chair   - incentive spirometer    #Leukocytosis  - WBCs uptrending  - Afebrile  - Likely reactive vs. steroid induced   - Unknown baseline   - Will monitor    #Type 2 DM  - HbA1C 8.8  - Continue ISS  - Continue lantus 15u qhs     #Hx CLL  - outpatient f/u    DVT ppx  - lovenox    Son Kwasi [phone] called 4/18

## 2020-04-18 NOTE — PROVIDER CONTACT NOTE (OTHER) - ASSESSMENT
Upon assessment pt presents diaphoretic, abdominal breathing, temperature 98.3F, 126/70, O2 saturation 100% after changing O2 probe on forehead. Elevated head of bed, then transferred to chair. Pt now sitting in chair  with O2 saturation at 100%

## 2020-04-19 PROCEDURE — 99239 HOSP IP/OBS DSCHRG MGMT >30: CPT

## 2020-04-19 NOTE — PROCEDURE NOTE - NSINFORMCONSENT_GEN_A_CORE
This was an emergent procedure.
cardiac arrest/This was an emergent procedure.
cardiac arrest/This was an emergent procedure.

## 2020-04-19 NOTE — PROCEDURE NOTE - NSTIMEOUT_GEN_A_CORE
Patient:   MICHAEL NEWMAN            MRN: [id number]            FIN: [id number]              Age:   65 years     Sex:  MALE     :  56   Associated Diagnoses:   None   Author:   DION SHEA RN. Pt is currently receiving Lantus 10U Q12H. Tolerating TF @ 30cc/hr, plans for TF to increase from tonight. Pt experienced labored breathing, and pulmonary service is on consult. BG was 171 at 5 AM.       Health Status   Allergies:    Allergic Reactions (All)  No Known Medication Allergies   Current medications:    Medications (29) Active  Scheduled: (12)  *Do NOT give IM Injections  1 each, N/A, Daily  *Do NOT give IM Injections  1 each, N/A, Daily  AMIODArone 200 mg tab  200 mg 1 tab, Oral, Daily  Aspirin 81 mg chew tab  81 mg 1 tab, Chewed, Daily  Bacitracin ointment 30 gm  1 application, Topical, BID  Calcium gluconate 10% 100 mg/mL inj 10 mL SDV [Ca 90 mg]  1,000 mg 10 mL, Slow IV Push, Once (scheduled)  Clopidogrel 75 mg tab  75 mg 1 tab, Oral, Daily  insulin glargine  12 unit 0.12 mL, Subcutaneous, Q12H (variable)  Insulin human lispro 1 unit/0.01 mL inj  2-12 units, Subcutaneous, Q6H  Pantoprazole 40 mg inj  40 mg, Slow IV Push, Q12H  Sucralfate 1,000 mg/10 mL oral susp repack  1,000 mg 10 mL, Oral, TID  Ursodiol 60 mg/mL oral liquid syringe  300 mg 5 mL, Oral, BID  Continuous: (11)  Dextrose 5% 1,000 mL  1,000 mL, IV, 40 mL/hr  EPINEPHrine 4 mg [2 mcg/min] + premixed in Dextrose 5% 250 mL  250 mL, IV, 7.5 mL/hr  heparin flush-NaCl 0.9% 1,000 unit  1,000 unit 500 mL, Flush, 3 mL/hr  heparin-dextrose 5% 25,000 unit  25,000 unit 250 mL, IV  NORepinephrine 32 mg [15 mcg/min] + premixed in Sodium Chloride 0.9% 250 mL  250 mL, IV, 7.03 mL/hr  Sodium Chloride 0.9% 1,000 mL  1,000 mL, IV, 10 mL/hr  Sodium Chloride 0.9% 1,000 mL  1,000 mL, IV, 6 mL/hr  Sodium Chloride 0.9% 1,000 mL  1,000 mL, IV, 5 mL/hr  Sodium Chloride 0.9% 1,000 mL  1,000 mL, IV, 3 mL/hr  Sodium Chloride 0.9% 1,000 mL   1,000 mL, IV, 5 mL/hr  vasopressin 20 unit [0.04 unit/min] + Sodium Chloride 0.9% 100 mL  100 mL, IV, 12 mL/hr  PRN: (6)  Albuterol-ipratropium 2.5-0.5 mg/3 mL nebulizer soln  3 mL, Nebulizer, Q6H  Dextrose (glucose) 40% 15 gm/37.5 gm oral gel UD  15 gm, Oral, As Directed PRN  Dextrose (glucose) 50% 25 gm/50 mL syringe  12.5 gm 25 mL, IV Push, As Directed PRN  dialysis replacement BGK4/2.5  30,000 mL, Dialysis, As Directed PRN  Glucagon 1 mg/1 mL emergency kit SDV  1 mg 1 mL, IM, As Directed PRN  Lacri-Lube (mineral oil-petrolatum) ophth oint 3.5 gm BULK  1 application, Each Conj Sac, As Directed PRN      Objective   Intake and Output   I&O 24 hr   I & O between:  11-APR-2020 12:48 TO 12-APR-2020 12:48  Med Dosing Weight:  102  kg   01-APR-2020  24 Hour Intake:   1500.67  ( 14.71 mL/kg )  24 Hour Output:   0.00           24 Hour Urine/Stool Output:   0.0  24 Hour Balance:   1500.67           24 Hour Urine Output:   0.00  ( 0.00 mL/kg/hr )                    Stool Count:  1       VS/Measurements     Vitals between:   11-APR-2020 12:48:04   TO   12-APR-2020 12:48:04                   LAST RESULT MINIMUM MAXIMUM  Temperature 36.7 36.7 37.1  Heart Rate 92 86 114  Respiratory Rate 28 19 34  NISBP           113 101 120  NIDBP           50 50 73  NIMBP           70 66 83  A Line Systolic 122 70 136  A Line Diastolic 64 29 66  A Line Mean 84 58 88  CVP                     4 1 4  SpO2                    96 91 99    Due to proposed guideline by chief medical staff to reduce the risk of transmission and preserve the PPE, physical examination is limited to single provider per day. Please refer to primary physician note for physical examination.        Results Review   General results   Interpretation:   Labs between:  11-APR-2020 12:48 to 12-APR-2020 12:48  CBC:                 WBC  HgB  Hct  Plt  MCV  RDW   12-APR-2020 8.6  (L) 10.8  (L) 33.2  (L) 122  90.7  (H) 20.8   DIFF:                 Seg  Neutroph//ABS  Lymph//ABS   Patient's first and last name, , procedure, and correct site confirmed prior to the start of procedure. Lynn//ABS  EOS/ABS  12-APR-2020 NOT APPLICABLE  88 // 7.5 6 // (L) 0.6  4 // 0.4 1 // 0.1  BMP:                 Na  Cl  BUN  Glu   12-APR-2020 138  105  (H) 78  (H) 166                              K  CO2  Cr  Ca                              4.3  21  (H) 2.95  8.8   CMP:                 AST  ALT  AlkPhos  Bili  Albumin   12-APR-2020 (H) 97  (H) 106  117  (H) 21.6  (L) 3.4   Other Chem:             Mg  Phos  Triglycerides  GGTP  DirectBili                           (H) 3.4  3.2         POC GLU:                 Latest Result  Latest Date  Minimum  Min Date  Maximum  Max Date                             (H) 236  12-APR-2020 (H) 236  12-APR-2020 (H) 263  11-APR-2020  COAG:                 INR  PT  PTT  Ddimer  Fibrinogen    12-APR-2020 1.4  (H) 14.8  (H) 43       Blood Gas:            Ph  PCO2  PO2  BiCarb  BaseExcess   Arterial:  12-APR-2020 7.42  (L) 29  (L) 66  (L) 19  NOT APPLICABLE                              Ionized Ca  Na  K  HgB  Lactic Acid                                      (H) 1.8   12-APR-2020 7.42  (L) 29  (L) 66  (L) 19  NOT APPLICABLE                              Ionized Ca  Na  K  HgB  Lactic Acid                                    (L) 11.0                            Impression and Plan   64yo male hx CAD s/p CABG 8 years ago, CHF, DM2 , HTN  admitted to [facility] with [facility] ED c/o of 2 day hx of fevers, chills, dyspnea, generalized malaise, and lower extremity weakness. . Pt was taken to cath lab, s/p stent placement of the vein graft. Post cath pt continued to deteriorate and transferred to [facility] with cardiogenic+septic shock and currently being medically managed. Endocrinology consult is requested  for management of diabetes.  # DM Type 2, uncontrolled  A1C 14.3%  Home medication:Tresiba 12 units daily + HL 15 TID with meals + MF 1000 mg BID.  4/9: Glycemia reviewed, Tube feeds held, discontinue glargine and all insulin SS, check q2H fingersticks,  if BG <80 can start D10  @ 20 CC/HR once BG >180 x2 can start LDSS  4/10:TF only on at 10 cc/hr,  Add MDSS if BG >180 x 2.  Increase D5 50 75 CC/HR IF BG <100  4/11: Blood sugar reviewed for the last 24 hours; last bs was 138. Pt currently on LDSS. No IF started as pt BS never dropped.  Cont HL SS coverage for now.  4/12: Blood sugar reviewed for the last 24 hours; last bs was 236. Currently on TF @ 30cc/hr, plans for TF to increase from tonight. Will increase Lantus to 12U Q12H from tonight. Hold Lantus if TF on hold. KEILY RN.  # Hx of CAD  CABG x3 – 8y ago  4/1/20 s/p PCI stenting of the vein graft to the obtuse marginal branch and posterior descending artery.  # Cardiogenic Shock  Acute on Chronic Systolic HFrEF/ ICM  on multi pressors, diuretics, on IABP per cardiology   # Acute hypoxemic respiratory failure  ID consulted following pt   COVID-19  negative x 3  wife + covid  # Septic Shock  Klebsiella ESBL bacteremia at [place]  D following    stress dose steroids managed per primary team  # Shocked Liver   AST/ALT/TBil elevated   # SOY on CKD Stage 3  Nephrology following pt   Code: GT modifier added  Charting performed by theodora Corbin for Dr. Thomas  All medical record entries made by the theodora were at my direction. I have reviewed the chart and agree that the record accurately reflects my personal performance of the history, physical exam, hospital course, and assessment and plan.

## 2020-04-19 NOTE — PROGRESS NOTE ADULT - REASON FOR ADMISSION
fever, cough, hypoxia
fever, cough, hypoxia, COVID +
fever, cough, hypoxia
fever, cough, hypoxia fu

## 2020-04-19 NOTE — CHART NOTE - NSCHARTNOTEFT_GEN_A_CORE
***Late entry, CODE BLUE called at 1120pm on 4/18/2020 as per george ***    -CODE blue called overhead, found patient un-repsonive and without pulse on arrival  -patient was quickly intubated using GLIDEscope and CPR/ACLS initated, compressions started.    =-initial rhythm asystole.  -3 rounds of epi given, was then found to be in ventricular vif/tach, at which time patient was shocked, and amio was given.  -after round of compressions, patient had ROSC and was noted to be in SVT, (likely from epi given)    patient noted to lose pulse and rhythm, roughly 5 minutes later and second code initiated.    during this code another 3 epi given , 1 shock, 1 amp bicarb given before ROSC obtained.    -post ROSC, patient with fixed and dilated pupils not-responsive, righyt femoral TLC placed, bradycardia noted    -shock state, pending initiation of levophed.    extremly [poor prognosis given extended duration of CPR/ACLS. Prior to these events patient had been treated for COVID 19 and had remained on 100% NRB    -he was found unresponsive by bedside RN or CNA.    -will call and updte family of acute events and discuss goals of care.    -further resuscitation efforts would likely be in vain, given poor overal prognosis, MSOF, and possible anoxic injury sustained during cardiac arrest.       CRITICAL CARE TIME SPENT:  50 minutes of critical care time spent providing medical care for patient's acute illness/conditions that impairs at least one vital organ system and/or poses a high risk of imminent or life threatening deterioration in the patient's condition. It includes time spent evaluating and treating the patient's acute illness as well as time spent reviewing labs, radiology, discussing goals of care with patient and/or patient's family, and discussing the case with a multidisciplinary team, including the eICU, in an effort to prevent further life threatening deterioration or end organ damage. This time is independent of any procedures performed.

## 2020-04-19 NOTE — PROCEDURE NOTE - NSINDICATIONS_GEN_A_CORE
critical illness/venous access
cardiac arrest
critical illness/emergency venous access/cardiac arrest/hypertonic/irritant infusion

## 2020-04-19 NOTE — PROCEDURE NOTE - ADDITIONAL PROCEDURE DETAILS
Dx: distributive shock
Time spent on procedure independent of Critical Care time spent at the bedside  Indication for procedure: COVID 19, Cardiac Arrest  Dx Code: U07.1, I46.9
Time spent on procedure independent of Critical Care time spent at the bedside  Indication for procedure: COIVD 19, Cardiac Arrest  Dx Code: U07.1, I46.9

## 2020-04-19 NOTE — PROCEDURE NOTE - NSPROCDETAILS_GEN_ALL_CORE
difficult/crash intubation
lumen(s) aspirated and flushed/sterile dressing applied/ultrasound guidance/guidewire recovered/sterile technique, catheter placed
sterile technique, catheter placed/sterile dressing applied/guidewire recovered/lumen(s) aspirated and flushed

## 2020-04-19 NOTE — DISCHARGE NOTE FOR THE EXPIRED PATIENT - HOSPITAL COURSE
69M COVID 19 +, developed worsening hypoxemic resp. failure, ARDS, and required intubation and mechanical vent support.   -davidetn was extubated on 4/14/2020 and transferred to tele  -on 4/18/2020 patient was found in room by tele staff unresponsiv with no pulse. Initially able to resuscitate and transfer to ICU, however on ICU arrival patient again went into cardiac arrest, unable to resuscitate.    TOD 0033 on 4/19/2020, family notified Attending provider updated as well 69M COVID 19 +, developed worsening hypoxemic resp. failure, ARDS, and required intubation and mechanical vent support.   -romann was extubated on 4/14/2020 and transferred to tele  -on 4/18/2020 patient was found in room by tele staff unresponsive with no pulse. Initially able to resuscitate and transfer to ICU, however on ICU arrival patient again went into cardiac arrest, unable to resuscitate.    TOD 0033 on 4/19/2020, family notified Attending provider updated as well    spoke to family notified

## 2020-04-19 NOTE — PROGRESS NOTE ADULT - SUBJECTIVE AND OBJECTIVE BOX
69 Bengali speaking male with hx of T2DM not on insulin, CLL presented with fevers, nonproductive cough and SOB x 3 days. SOB much worse and brought in by son. COVID + on 4/6/2020.   Acute respiratory failure Extubated 4/14, Viral pneumonia secondary to COVID 19    code blue called  he was found unresponsive without a pulse      acls protocol followed,   cpr started  patient attained a pulse, after multiple epi, shock, Amiodarone  1 am of bicarb and 1gm of magnesium given IV   placed on vent support and transferred to icu  found to have pupils fixed and dilated  extremely poor prognosis    family called by icu team 69 Turkmen speaking male with hx of T2DM not on insulin, CLL presented with fevers, nonproductive cough and SOB x 3 days. SOB much worse and brought in by son. COVID + on 4/6/2020.   Acute respiratory failure Extubated 4/14, Viral pneumonia secondary to COVID 19    code blue called  he was found unresponsive without a pulse      acls protocol followed,   cpr started  patient attained a pulse, after multiple epi, shock, Amiodarone  1 am of bicarb and 1gm of magnesium given IV   placed on vent support and transferred to icu  found to have pupils fixed and dilated  extremely poor prognosis    soon after reaching icu, patient went in to cardiac arrest , he could not be revived.  he was pronounced dead at 00.33 on 4/19/20  family called and notified

## 2021-03-04 NOTE — PROGRESS NOTE ADULT - SUBJECTIVE AND OBJECTIVE BOX
Follow-up Critical Care Progress Note  Chief Complaint : Acute respiratory failure with hypoxia    Remains extubated, mild respiratory distress but maintain saturation on NRB    Allergies :No Known Allergies    PAST MEDICAL & SURGICAL HISTORY:  CLL (chronic lymphocytic leukemia)  DM (diabetes mellitus)  No significant past surgical history    Medications:  MEDICATIONS  (STANDING):  chlorhexidine 4% Liquid 1 Application(s) Topical <User Schedule>  dextrose 50% Injectable 12.5 Gram(s) IV Push once  dextrose 50% Injectable 25 Gram(s) IV Push once  dextrose 50% Injectable 25 Gram(s) IV Push once  enoxaparin Injectable 40 milliGRAM(s) SubCutaneous every 12 hours  insulin glargine Injectable (LANTUS) 28 Unit(s) SubCutaneous at bedtime  insulin lispro (HumaLOG) corrective regimen sliding scale   SubCutaneous Before meals and at bedtime  methylPREDNISolone sodium succinate Injectable 40 milliGRAM(s) IV Push every 12 hours  pantoprazole   Suspension 40 milliGRAM(s) Enteral Tube daily  senna 2 Tablet(s) Oral at bedtime    MEDICATIONS  (PRN):  acetaminophen  Suppository .. 650 milliGRAM(s) Rectal every 4 hours PRN Temp greater or equal to 38.5C (101.3F)  ALBUTerol    90 MICROgram(s) HFA Inhaler 2 Puff(s) Inhalation every 4 hours PRN Shortness of Breath and/or Wheezing  sodium chloride 0.9% lock flush 10 milliLiter(s) IV Push every 1 hour PRN Pre/post blood products, medications, blood draw, and to maintain line patency    LABS:                      14.9   20.99 )-----------( 345      ( 15 Apr 2020 04:45 )             45.3     04-15  144  |  107  |  21  ----------------------------<  135<H>  3.8   |  28  |  0.48<L>    Ca    8.5      15 Apr 2020 04:45  Phos  3.5     04-15  Mg     2.1     04-15    TPro  6.4  /  Alb  2.6<L>  /  TBili  0.7  /  DBili  x   /  AST  45<H>  /  ALT  45  /  AlkPhos  100  04-15    HIT ab -- 04-11 @ 14:32  HIT ab EIA --  D Dimer -482  HIT ab -- 04-10 @ 08:04  HIT ab EIA --  D Dimer -761    Procalcitonin, Serum: 0.10 ng/mL (04-15-20 @ 04:45)    CULTURES: (if applicable)    Culture - Blood (collected 04-12-20 @ 06:30)  Source: .Blood Blood-Peripheral  Preliminary Report (04-13-20 @ 11:01):    No growth to date.    Culture - Blood (collected 04-06-20 @ 03:18)  Source: .Blood Blood-Peripheral  Final Report (04-10-20 @ 09:01):    No Growth Final    Culture - Blood (collected 04-06-20 @ 03:18)  Source: .Blood Blood-Peripheral  Final Report (04-10-20 @ 09:01):    No Growth Final    VITALS:  T(C): 37.2 (04-15-20 @ 08:00), Max: 37.2 (04-15-20 @ 08:00)  T(F): 98.9 (04-15-20 @ 08:00), Max: 98.9 (04-15-20 @ 08:00)  HR: 97 (04-15-20 @ 08:00) (89 - 116)  BP: 145/60 (04-15-20 @ 08:00) (110/48 - 151/61)  BP(mean): 82 (04-15-20 @ 08:00) (61 - 86)  ABP: --  ABP(mean): --  RR: 40 (04-15-20 @ 08:00) (34 - 55)  SpO2: 99% (04-15-20 @ 08:00) (94% - 99%)  CVP(mm Hg): --  CVP(cm H2O): --    Ins and Outs     04-14-20 @ 07:01  -  04-15-20 @ 07:00  --------------------------------------------------------  IN: 238 mL / OUT: 1350 mL / NET: -1112 mL    I&O's Detail    14 Apr 2020 07:01  -  15 Apr 2020 07:00  --------------------------------------------------------  IN:    Oral Fluid: 238 mL  Total IN: 238 mL    OUT:    Indwelling Catheter - Urethral: 1350 mL  Total OUT: 1350 mL    Total NET: -1112 mL Wound Care: Polysporin ointment

## 2022-12-08 NOTE — ED ADULT NURSE NOTE - NS ED NURSE REPORT GIVEN TO FT
Start taking 2 capfuls of MiraLAX a day in liquid.  You may go up or down on the amount of MiraLAX to suit your bowel habits.  Start taking omeprazole for Manrique's esophagus Ethan